# Patient Record
Sex: FEMALE | Race: WHITE | NOT HISPANIC OR LATINO | Employment: FULL TIME | URBAN - METROPOLITAN AREA
[De-identification: names, ages, dates, MRNs, and addresses within clinical notes are randomized per-mention and may not be internally consistent; named-entity substitution may affect disease eponyms.]

---

## 2017-01-29 ENCOUNTER — OFFICE VISIT (OUTPATIENT)
Dept: URGENT CARE | Facility: MEDICAL CENTER | Age: 47
End: 2017-01-29
Payer: COMMERCIAL

## 2017-01-29 PROCEDURE — G0382 LEV 3 HOSP TYPE B ED VISIT: HCPCS

## 2019-08-21 ENCOUNTER — OFFICE VISIT (OUTPATIENT)
Dept: URGENT CARE | Facility: MEDICAL CENTER | Age: 49
End: 2019-08-21
Payer: COMMERCIAL

## 2019-08-21 VITALS
TEMPERATURE: 98.3 F | BODY MASS INDEX: 27.46 KG/M2 | RESPIRATION RATE: 20 BRPM | HEIGHT: 63 IN | WEIGHT: 155 LBS | SYSTOLIC BLOOD PRESSURE: 118 MMHG | OXYGEN SATURATION: 97 % | DIASTOLIC BLOOD PRESSURE: 70 MMHG | HEART RATE: 85 BPM

## 2019-08-21 DIAGNOSIS — M65.4 DE QUERVAIN'S TENOSYNOVITIS, LEFT: Primary | ICD-10-CM

## 2019-08-21 PROCEDURE — 99213 OFFICE O/P EST LOW 20 MIN: CPT | Performed by: PHYSICIAN ASSISTANT

## 2019-08-21 NOTE — PROGRESS NOTES
[facility]        NAME: Ada Gonzalez is a 54 y o  female  : 1970    MRN: [id number]  DATE: 2019  TIME: 2:55 PM    Assessment and Plan   De Quervain's tenosynovitis, left [M65 4]  1  De Quervain's tenosynovitis, left       Physical exam findings consistent with tenosynovitis  Thumb spica splint applied to left hand  Advised patient to take Aleve or Motrin for inflammation and pain  Advised to ice the affected area  Recommended following up with PCP if symptoms do not improve in the next week or two  Patient Instructions    Please wear splint  During the day while at work, can remove at night   take Aleve or Motrin for inflammation and pain   may ice the affected area   follow up with PCP if symptoms do not resolve    Chief Complaint     Chief Complaint   Patient presents with    Wrist Pain     x 1 5 weeks left wrist pain , no injury   pain starts to left thumb and radiates into wrist           History of Present Illness        Patient is a 30-year-old female who comes in with left wrist pain at the base of the left thumb area  She states this is been present for about a week and a half  Patient is a  and does repetitive motion daily  She denies any known injuries or falls  She denies any numbness or tingling  Patient reports pain when moving her thumb uncertain movements  Review of Systems   Review of Systems   Constitutional: Negative for fever  Respiratory: Negative for shortness of breath  Cardiovascular: Negative for chest pain  Gastrointestinal: Negative for abdominal pain  Musculoskeletal: Positive for arthralgias  Negative for joint swelling  Skin: Negative for color change  Current Medications     No current outpatient medications on file      Current Allergies     Allergies as of 2019    (No Known Allergies)            The following portions of the patient's history were reviewed and updated as appropriate: allergies, current medications, past family history, past medical history, past social history, past surgical history and problem list      History reviewed  No pertinent past medical history  Past Surgical History:   Procedure Laterality Date    BACK SURGERY       SECTION         History reviewed  No pertinent family history  Medications have been verified  Objective   /70   Pulse 85   Temp 98 3 °F (36 8 °C) (Temporal)   Resp 20   Ht 5' 3" (1 6 m)   Wt 70 3 kg (155 lb)   SpO2 97%   BMI 27 46 kg/m²        Physical Exam     Physical Exam   Constitutional: She appears well-developed and well-nourished  Cardiovascular: Normal rate and regular rhythm  Pulmonary/Chest: Effort normal and breath sounds normal    Musculoskeletal:        Arms:       Left hand: Normal strength noted  Hands:  Skin: Skin is warm and dry

## 2019-08-21 NOTE — PATIENT INSTRUCTIONS
Please wear splint  During the day while at work, can remove at night   take Aleve or Motrin for inflammation and pain   may ice the affected area   follow up with PCP if symptoms do not resolve    Tenosynovitis   WHAT YOU NEED TO KNOW:   Tenosynovitis is inflammation of a tendon and its synovium  Tendons are cords of tissue that connect muscles to bones  The synovium is the lining of the sheath around the tendon  The tendons may become thickened and not slide smoothly through the swollen lining  The cause of tenosynovitis is not known  DISCHARGE INSTRUCTIONS:   Medicines:   · Pain medicines  may be given  Ask how to take this medicine safely  · NSAIDs , such as ibuprofen, help decrease swelling, pain, and fever  This medicine is available with or without a doctor's order  NSAIDs can cause stomach bleeding or kidney problems in certain people  If you take blood thinner medicine, always ask your healthcare provider if NSAIDs are safe for you  Always read the medicine label and follow directions  · Antibiotics  help treat a bacterial infection  · Antifungals  help treat a fungal infection  · Take your medicine as directed  Contact your healthcare provider if you think your medicine is not helping or if you have side effects  Tell him of her if you are allergic to any medicine  Keep a list of the medicines, vitamins, and herbs you take  Include the amounts, and when and why you take them  Bring the list or the pill bottles to follow-up visits  Carry your medicine list with you in case of an emergency  Follow up with your healthcare provider as directed:  Write down your questions so you remember to ask them during your visits  Self-care:   · Rest  your inflamed area as directed  · Apply ice  on your inflamed area for 15 to 20 minutes every hour or as directed  Use an ice pack, or put crushed ice in a plastic bag  Cover it with a towel   Ice helps prevent tissue damage and decreases swelling and pain     · Elevate  your inflamed area above the level of your heart as often as you can  This will help decrease swelling and pain  Prop your inflamed area on pillows or blankets to keep it elevated comfortably  · Use your assistive device as directed  You may need a brace, splint, walking boot, or cast  You may also need to use crutches or orthotics  These devices prevent movement, decrease pain, and help your tendon heal     · Go to physical or occupational therapy  A physical therapist teaches you exercises to help improve movement and strength and decrease pain  An occupational therapist teaches you skills to help with your daily activities  Contact your healthcare provider if:   · You have a fever  · A joint near your inflamed area is red and swollen  · Your symptoms do not go away or they get worse, even after treatment  · You have questions or concerns about your condition or care  Return to the emergency department if:   · You have sudden trouble breathing or chest pain  · Your arm, leg, fingers, or toes are numb, tingle, or are pale  · You hear or feel a pop  · You have severe pain  © 2017 [facility] Information is for End User's use only and may not be sold, redistributed or otherwise used for commercial purposes  All illustrations and images included in CareNotes® are the copyrighted property of A D A M , Inc  or Nikos Lomeli  The above information is an  only  It is not intended as medical advice for individual conditions or treatments  Talk to your doctor, nurse or pharmacist before following any medical regimen to see if it is safe and effective for you

## 2019-08-31 ENCOUNTER — HOSPITAL ENCOUNTER (EMERGENCY)
Facility: HOSPITAL | Age: 49
Discharge: HOME | End: 2019-08-31
Attending: EMERGENCY MEDICINE | Admitting: EMERGENCY MEDICINE
Payer: COMMERCIAL

## 2019-08-31 VITALS
BODY MASS INDEX: 27.34 KG/M2 | RESPIRATION RATE: 18 BRPM | OXYGEN SATURATION: 97 % | DIASTOLIC BLOOD PRESSURE: 90 MMHG | SYSTOLIC BLOOD PRESSURE: 119 MMHG | WEIGHT: 154.32 LBS | HEART RATE: 64 BPM

## 2019-08-31 DIAGNOSIS — M65.4 DE QUERVAIN'S TENOSYNOVITIS, LEFT: Primary | ICD-10-CM

## 2019-08-31 PROCEDURE — 99283 EMERGENCY DEPT VISIT LOW MDM: CPT

## 2019-08-31 PROCEDURE — 29130 APPL FINGER SPLINT STATIC: CPT | Performed by: EMERGENCY MEDICINE

## 2019-08-31 PROCEDURE — 99283 EMERGENCY DEPT VISIT LOW MDM: CPT | Performed by: EMERGENCY MEDICINE

## 2019-08-31 NOTE — ED NOTES
Patient assessed, treated (includes splinting) and discharged by provider       Facundo Granado RN  08/31/19 7039

## 2019-08-31 NOTE — ED PROVIDER NOTES
History  Chief Complaint   Patient presents with    Wrist Pain     pt presents with left wrist pain x 3 weeks  denies known injury  was seen at a walk in 1 week ago and diagnosed with an inflamed tendon  was told to come back if not g etting better  pt states pain persists but has improved slightly since last week  55 y/o female presents today complaining of left wrist pain which started approximately 3 weeks ago  Was seen and evaluated at urgent care for same  She had a splint placed and was recommended to follow up with her PCP if symptoms did not improve  She did not see her PCP she does came here today  No injury  History provided by:  Patient  Wrist Pain   Location:  Base of the left thumb  Severity:  Mild  Onset quality:  Gradual  Duration:  3 weeks  Timing:  Constant  Progression:  Waxing and waning  Chronicity:  New  Context:  See HPI  Associated symptoms: no fever, no headaches and no rash        None       History reviewed  No pertinent past medical history  Past Surgical History:   Procedure Laterality Date    BACK SURGERY       SECTION         History reviewed  No pertinent family history  I have reviewed and agree with the history as documented  Social History     Tobacco Use    Smoking status: Never Smoker    Smokeless tobacco: Never Used   Substance Use Topics    Alcohol use: Not Currently    Drug use: Never        Review of Systems   Constitutional: Negative for chills and fever  Musculoskeletal: Positive for arthralgias and joint swelling  Skin: Negative for pallor, rash and wound  Neurological: Negative for dizziness and headaches  Psychiatric/Behavioral: Negative for confusion  Physical Exam  Physical Exam   Constitutional: She is oriented to person, place, and time  She appears well-developed and well-nourished  HENT:   Head: Normocephalic and atraumatic  Musculoskeletal:        Left wrist: She exhibits tenderness   She exhibits no swelling and no effusion  Finkelstein's test positive  Tenderness of the APL and EPB  Neurological: She is alert and oriented to person, place, and time  Skin: Skin is warm and dry  Capillary refill takes less than 2 seconds  No rash noted  Psychiatric: She has a normal mood and affect  Nursing note and vitals reviewed  Vital Signs  ED Triage Vitals [08/31/19 1530]   Temp Pulse Respirations Blood Pressure SpO2   -- 64 18 119/90 97 %      Temp src Heart Rate Source Patient Position - Orthostatic VS BP Location FiO2 (%)   -- Monitor Sitting Right arm --      Pain Score       6           Vitals:    08/31/19 1530   BP: 119/90   Pulse: 64   Patient Position - Orthostatic VS: Sitting         Visual Acuity      ED Medications  Medications - No data to display    Diagnostic Studies  Results Reviewed     None                 No orders to display              Procedures  Splint application  Date/Time: 8/31/2019 3:54 PM  Performed by: Tere Primrose, DO  Authorized by: Tere Primrose, DO     Patient location:  ED  Procedure performed by emergency physician: Yes    Other Assisting Provider: No    Consent:     Consent obtained:  Verbal    Consent given by:  Patient    Risks discussed:  Discoloration, numbness, pain and swelling    Alternatives discussed:  No treatment, delayed treatment, alternative treatment, observation and referral  Universal protocol:     Procedure explained and questions answered to patient or proxy's satisfaction: yes      Patient identity confirmed:  Verbally with patient  Indication:     Indications: sprain/strain    Pre-procedure details:     Sensation:  Normal    Skin color:  Pink  Procedure details:     Laterality:  Left    Location:  Wrist    Wrist:  L wrist    Splint type:  Thumb spica    Supplies:  Ortho-Glass and elastic bandage  Post-procedure details:     Pain:  Improved    Sensation:  Normal    Neurovascular Exam: skin pink      Patient tolerance of procedure:   Tolerated well, no immediate complications           ED Course                               MDM  Number of Diagnoses or Management Options  De Quervain's tenosynovitis, left: new and does not require workup  Diagnosis management comments: Splint placed by me  Recommend follow-up with Ortho as an outpatient  Risk of Complications, Morbidity, and/or Mortality  Presenting problems: low  Diagnostic procedures: low  Management options: low    Patient Progress  Patient progress: stable      Disposition  Final diagnoses:   De Quervain's tenosynovitis, left     Time reflects when diagnosis was documented in both MDM as applicable and the Disposition within this note     Time User Action Codes Description Comment    8/31/2019  3:42 PM Alina Jennings Add [M65 4] De Quervain's tenosynovitis, left       ED Disposition     ED Disposition Condition Date/Time Comment    Discharge Stable Sat Aug 31, 2019  3:42 PM Donovan Montano discharge to home/self care  Follow-up Information     Follow up With Specialties Details Why Contact Info Additional [address] [facility][note 3] Orthopedic Surgery Schedule an appointment as soon as possible for a visit   [facility] [id number]  [place] [address], [address] [place] [address], [id number]          There are no discharge medications for this patient  No discharge procedures on file      ED Provider  Electronically Signed by           Valentina Muller DO  08/31/19 7562

## 2019-09-09 ENCOUNTER — OFFICE VISIT (OUTPATIENT)
Dept: OBGYN CLINIC | Facility: MEDICAL CENTER | Age: 49
End: 2019-09-09
Payer: COMMERCIAL

## 2019-09-09 VITALS
DIASTOLIC BLOOD PRESSURE: 77 MMHG | HEIGHT: 63 IN | BODY MASS INDEX: 28.6 KG/M2 | WEIGHT: 161.4 LBS | SYSTOLIC BLOOD PRESSURE: 119 MMHG | HEART RATE: 69 BPM

## 2019-09-09 DIAGNOSIS — M65.4 DE QUERVAIN'S TENOSYNOVITIS, LEFT: Primary | ICD-10-CM

## 2019-09-09 PROCEDURE — 20550 NJX 1 TENDON SHEATH/LIGAMENT: CPT | Performed by: ORTHOPAEDIC SURGERY

## 2019-09-09 PROCEDURE — 99203 OFFICE O/P NEW LOW 30 MIN: CPT | Performed by: ORTHOPAEDIC SURGERY

## 2019-09-09 RX ADMIN — METHYLPREDNISOLONE ACETATE 0.5 ML: 40 INJECTION, SUSPENSION INTRA-ARTICULAR; INTRALESIONAL; INTRAMUSCULAR; SOFT TISSUE at 11:09

## 2019-09-09 RX ADMIN — LIDOCAINE HYDROCHLORIDE 0.5 ML: 10 INJECTION, SOLUTION INFILTRATION; PERINEURAL at 11:09

## 2019-09-09 NOTE — PROGRESS NOTES
CHIEF COMPLAINT:  Chief Complaint   Patient presents with    Left Wrist - Pain       SUBJECTIVE:  Camilo Ceballos is a 54y o  year old female in for evaluation of her left wrist  She was seen at urgent care 19 and diagnosed with dequervain's tenosynitis and given wrist splint  Seen in ED 19 due to continued pain which was slightly better  She was placed in orthoglass thumb spica and her for f/u  No known injury or trauma  Pain ongoing for the past 5 weeks  PAST MEDICAL HISTORY:  History reviewed  No pertinent past medical history  PAST SURGICAL HISTORY:  Past Surgical History:   Procedure Laterality Date    BACK SURGERY       SECTION         FAMILY HISTORY:  History reviewed  No pertinent family history  SOCIAL HISTORY:  Social History     Tobacco Use    Smoking status: Never Smoker    Smokeless tobacco: Never Used   Substance Use Topics    Alcohol use: Not Currently    Drug use: Never       MEDICATIONS:  No current outpatient medications on file      ALLERGIES:  No Known Allergies    REVIEW OF SYSTEMS:  Review of Systems    VITALS:  Vitals:    19 1042   BP: 119/77   Pulse: 69       LABS:  HgA1c: No results found for: HGBA1C  BMP: No results found for: GLUCOSE, CALCIUM, NA, K, CO2, CL, BUN, CREATININE    _____________________________________________________  PHYSICAL EXAMINATION:  General: well developed and well nourished, alert, oriented times 3 and appears comfortable  Psychiatric: normal   HEENT: Trachea Midline, No torticollis  Pulmonary: No audible wheezing or respiratory distress   Skin: intact  Neurovascular: Sensation Intact to the Median, Ulnar, Radial Nerve, Motor Intact to the Median, Ulnar, Radial Nerve and Pulses Intact    MUSCULOSKELETAL EXAMINATION:  Left wrist    +Finkelstein's left wrist  Pain over 1st dorsal extensor compartment of left thumb  Pain with resisted ABD of thumb   Sensation intact throughout thumb ___________________________________________________  STUDIES REVIEWED:  No studies reviewed  PROCEDURES PERFORMED:  Hand/upper extremity injection: L extensor compartment 1  Date/Time: 9/9/2019 11:09 AM  Consent given by: patient  Site marked: site marked  Timeout: Immediately prior to procedure a time out was called to verify the correct patient, procedure, equipment, support staff and site/side marked as required   Supporting Documentation  Indications: pain   Procedure Details  Condition:de Quervain's tenosynovitis Site: L extensor compartment 1   Preparation: Patient was prepped and draped in the usual sterile fashion  Needle size: 25 G  Ultrasound guidance: no  Approach: dorsal  Medications administered: 0 5 mL lidocaine 1 %; 0 5 mL methylPREDNISolone acetate 40 mg/mL    Patient tolerance: patient tolerated the procedure well with no immediate complications  Dressing:  Sterile dressing applied             _____________________________________________________  ASSESSMENT/PLAN:  Left de quervain's tenosynitis      -csi offered and accepted  -use heat for any residual symptoms  -d/c thumb spica splint  -activities to tolerance    -see in 2 weeks to see if CSI was beneficial     There are no diagnoses linked to this encounter  Follow Up:  Return in about 2 weeks (around 9/23/2019) for Recheck  General Discussions:  Renetta Ferrari Tenosynovitis: The anatomy and physiology of de Quervain's tenosynovitis was discussed with the patient today in the office  Edema and increased contact pressure within the first dorsal extensor compartment at the radial styloid can cause pain, crepitation, and limitation of function  Treatment options include resting thumb spica splints to decrease edema, oral anti-inflammatory medications, home or formal therapy exercises, up to 2 steroid injections within the first dorsal extensor compartment, or surgical release    While majority of patients do respond to conservative treatment, up to 20% may require surgical release         Scribe Attestation    I,:   Elida Carreno am acting as a scribe while in the presence of the attending physician :        I,:   Aniket Gomez MD personally performed the services described in this documentation    as scribed in my presence :

## 2019-09-10 RX ORDER — LIDOCAINE HYDROCHLORIDE 10 MG/ML
0.5 INJECTION, SOLUTION INFILTRATION; PERINEURAL
Status: COMPLETED | OUTPATIENT
Start: 2019-09-09 | End: 2019-09-09

## 2019-09-10 RX ORDER — METHYLPREDNISOLONE ACETATE 40 MG/ML
0.5 INJECTION, SUSPENSION INTRA-ARTICULAR; INTRALESIONAL; INTRAMUSCULAR; SOFT TISSUE
Status: COMPLETED | OUTPATIENT
Start: 2019-09-09 | End: 2019-09-09

## 2019-09-23 ENCOUNTER — OFFICE VISIT (OUTPATIENT)
Dept: OBGYN CLINIC | Facility: MEDICAL CENTER | Age: 49
End: 2019-09-23
Payer: COMMERCIAL

## 2019-09-23 VITALS
HEIGHT: 63 IN | SYSTOLIC BLOOD PRESSURE: 117 MMHG | DIASTOLIC BLOOD PRESSURE: 74 MMHG | WEIGHT: 160.8 LBS | BODY MASS INDEX: 28.49 KG/M2 | HEART RATE: 55 BPM

## 2019-09-23 DIAGNOSIS — M65.4 DE QUERVAIN'S TENOSYNOVITIS, LEFT: Primary | ICD-10-CM

## 2019-09-23 PROCEDURE — 99213 OFFICE O/P EST LOW 20 MIN: CPT | Performed by: ORTHOPAEDIC SURGERY

## 2019-09-23 NOTE — PROGRESS NOTES
CHIEF COMPLAINT:  Chief Complaint   Patient presents with    Left Wrist - Follow-up       SUBJECTIVE:  Monique Vallejo is a 54y o  year old  female who presents to the office S/P left De Quirvain tenosynovitis CSI administered 19  Pt states that she has not been having pain since the CSI  Pt states that she does have some popping sensation in her left wrist in the morning  PAST MEDICAL HISTORY:  History reviewed  No pertinent past medical history  PAST SURGICAL HISTORY:  Past Surgical History:   Procedure Laterality Date    BACK SURGERY       SECTION         FAMILY HISTORY:  History reviewed  No pertinent family history  SOCIAL HISTORY:  Social History     Tobacco Use    Smoking status: Never Smoker    Smokeless tobacco: Never Used   Substance Use Topics    Alcohol use: Not Currently    Drug use: Never       MEDICATIONS:  No current outpatient medications on file  ALLERGIES:  No Known Allergies    REVIEW OF SYSTEMS:  Review of Systems   Constitutional: Negative for chills, fever and unexpected weight change  HENT: Negative for hearing loss, nosebleeds and sore throat  Eyes: Negative for pain, redness and visual disturbance  Respiratory: Negative for cough, shortness of breath and wheezing  Cardiovascular: Negative for chest pain, palpitations and leg swelling  Gastrointestinal: Negative for abdominal pain, nausea and vomiting  Endocrine: Negative for polydipsia and polyuria  Genitourinary: Negative for dysuria and hematuria  Skin: Negative for rash and wound  Neurological: Negative for dizziness and headaches  Psychiatric/Behavioral: Negative for decreased concentration, dysphoric mood and suicidal ideas  The patient is not nervous/anxious          VITALS:  Vitals:    19 0901   BP: 117/74   Pulse: 55       LABS:  HgA1c: No results found for: HGBA1C  BMP: No results found for: GLUCOSE, CALCIUM, NA, K, CO2, CL, BUN, CREATININE    _____________________________________________________  PHYSICAL EXAMINATION:  General: well developed and well nourished, alert, oriented times 3 and appears comfortable  Psychiatric: Normal  HEENT: Trachea Midline, No torticollis  Pulmonary: No audible wheezing or respiratory distress   Skin: No masses, erythema, lacerations, fluctation, ulcerations  Neurovascular: Sensation Intact to the Median, Ulnar, Radial Nerve, Motor Intact to the Median, Ulnar, Radial Nerve and Pulses Intact    MUSCULOSKELETAL EXAMINATION:  De Quervain's Tenosynovitis Exam:  left side    Negative tender to palpation over 1st dorsal extensor compartment   Negative palpable nodule  Negative crepitus over 1st dorsal extensor compartment   Negative Finkelstein's test    Negative pain with resisted abduction of the thumb      ___________________________________________________  STUDIES REVIEWED:  No studies reviewed  PROCEDURES PERFORMED:  Procedures  No Procedures performed today    _____________________________________________________  ASSESSMENT/PLAN:    Left De Quervain tenosynovitis   * patient was advised to continue applying heat and stretching as shown in the office  * patient was advised to call the office if she has return of pain that she is not able to manage at home      Follow Up:  Return if symptoms worsen or fail to improve          To Do Next Visit:  Re-evaluation of current issue        Scribe Attestation    I,:   Rhys Bello am acting as a scribe while in the presence of the attending physician :        I,:   Anita Wu MD personally performed the services described in this documentation    as scribed in my presence :

## 2020-05-05 ENCOUNTER — OFFICE VISIT (OUTPATIENT)
Dept: OBGYN CLINIC | Facility: CLINIC | Age: 50
End: 2020-05-05
Payer: COMMERCIAL

## 2020-05-05 VITALS
SYSTOLIC BLOOD PRESSURE: 107 MMHG | HEART RATE: 49 BPM | BODY MASS INDEX: 28.49 KG/M2 | HEIGHT: 63 IN | WEIGHT: 160.8 LBS | DIASTOLIC BLOOD PRESSURE: 73 MMHG

## 2020-05-05 DIAGNOSIS — M65.4 DE QUERVAIN'S TENOSYNOVITIS, LEFT: Primary | ICD-10-CM

## 2020-05-05 PROCEDURE — 20550 NJX 1 TENDON SHEATH/LIGAMENT: CPT | Performed by: ORTHOPAEDIC SURGERY

## 2020-05-05 PROCEDURE — 99213 OFFICE O/P EST LOW 20 MIN: CPT | Performed by: ORTHOPAEDIC SURGERY

## 2020-05-05 RX ORDER — TRIAMCINOLONE ACETONIDE 40 MG/ML
20 INJECTION, SUSPENSION INTRA-ARTICULAR; INTRAMUSCULAR
Status: COMPLETED | OUTPATIENT
Start: 2020-05-05 | End: 2020-05-05

## 2020-05-05 RX ORDER — LIDOCAINE HYDROCHLORIDE 10 MG/ML
0.5 INJECTION, SOLUTION INFILTRATION; PERINEURAL
Status: COMPLETED | OUTPATIENT
Start: 2020-05-05 | End: 2020-05-05

## 2020-05-05 RX ADMIN — TRIAMCINOLONE ACETONIDE 20 MG: 40 INJECTION, SUSPENSION INTRA-ARTICULAR; INTRAMUSCULAR at 08:10

## 2020-05-05 RX ADMIN — LIDOCAINE HYDROCHLORIDE 0.5 ML: 10 INJECTION, SOLUTION INFILTRATION; PERINEURAL at 08:10

## 2020-09-28 ENCOUNTER — OFFICE VISIT (OUTPATIENT)
Dept: OBGYN CLINIC | Facility: MEDICAL CENTER | Age: 50
End: 2020-09-28
Payer: COMMERCIAL

## 2020-09-28 VITALS
SYSTOLIC BLOOD PRESSURE: 113 MMHG | HEIGHT: 63 IN | BODY MASS INDEX: 28.49 KG/M2 | DIASTOLIC BLOOD PRESSURE: 71 MMHG | HEART RATE: 65 BPM | WEIGHT: 160.8 LBS | TEMPERATURE: 96.5 F

## 2020-09-28 DIAGNOSIS — M65.4 DE QUERVAIN'S TENOSYNOVITIS, LEFT: Primary | ICD-10-CM

## 2020-09-28 PROCEDURE — 20550 NJX 1 TENDON SHEATH/LIGAMENT: CPT | Performed by: ORTHOPAEDIC SURGERY

## 2020-09-28 PROCEDURE — 99213 OFFICE O/P EST LOW 20 MIN: CPT | Performed by: ORTHOPAEDIC SURGERY

## 2020-09-28 RX ORDER — LIDOCAINE HYDROCHLORIDE 10 MG/ML
0.5 INJECTION, SOLUTION INFILTRATION; PERINEURAL
Status: COMPLETED | OUTPATIENT
Start: 2020-09-28 | End: 2020-09-28

## 2020-09-28 RX ORDER — TRIAMCINOLONE ACETONIDE 40 MG/ML
20 INJECTION, SUSPENSION INTRA-ARTICULAR; INTRAMUSCULAR
Status: COMPLETED | OUTPATIENT
Start: 2020-09-28 | End: 2020-09-28

## 2020-09-28 RX ADMIN — LIDOCAINE HYDROCHLORIDE 0.5 ML: 10 INJECTION, SOLUTION INFILTRATION; PERINEURAL at 14:19

## 2020-09-28 RX ADMIN — TRIAMCINOLONE ACETONIDE 20 MG: 40 INJECTION, SUSPENSION INTRA-ARTICULAR; INTRAMUSCULAR at 14:19

## 2020-09-28 NOTE — PROGRESS NOTES
CHIEF COMPLAINT:  Chief Complaint   Patient presents with    Left Wrist - Follow-up       SUBJECTIVE:  Jil Santillan is a 52y o  year old female who presents for follow-up regarding left de Quervain tenosynovitis  Her last cortisone injection was on 2020  She has had 2 cortisone injections at this point     She would like a repeat cortisone injection for this issue as the pain started to come back about 1 month ago  She denies any numbness or tingling  PAST MEDICAL HISTORY:  History reviewed  No pertinent past medical history  PAST SURGICAL HISTORY:  Past Surgical History:   Procedure Laterality Date    BACK SURGERY       SECTION         FAMILY HISTORY:  History reviewed  No pertinent family history  SOCIAL HISTORY:  Social History     Tobacco Use    Smoking status: Never Smoker    Smokeless tobacco: Never Used   Substance Use Topics    Alcohol use: Not Currently    Drug use: Never       MEDICATIONS:  No current outpatient medications on file      ALLERGIES:  No Known Allergies    REVIEW OF SYSTEMS:  Review of Systems  ROS:   General: no fever, no chills  HEENT:  No loss of hearing or eyesight problems  Eyes:  No red eyes  Respiratory:  No coughing, shortness of breath or wheezing  Cardiovascular:  No chest pain, no palpitations  GI:  Abdomen soft nontender, denies nausea  Endocrine:  No muscle weakness, no frequent urination, no excessive thirst  Urinary:  No dysuria, no incontinence  Musculoskeletal: see HPI and PE  SKIN:  No skin rash, no dry skin  Neurological:  No headaches, no confusion  Psychiatric:  No suicide thoughts, no anxiety, no depression  Review of all other systems is negative    VITALS:  Vitals:    20 1402   BP: 113/71   Pulse: 65   Temp: (!) 96 5 °F (35 8 °C)       LABS:  HgA1c: No results found for: HGBA1C  BMP: No results found for: GLUCOSE, CALCIUM, NA, K, CO2, CL, BUN, CREATININE    _____________________________________________________  PHYSICAL EXAMINATION:  General: well developed and well nourished, alert, oriented times 3 and appears comfortable  Psychiatric: Normal  HEENT: Trachea Midline, No torticollis  Pulmonary: No audible wheezing or respiratory distress   Skin: No masses, erythema, lacerations, fluctation, ulcerations  Neurovascular: Sensation Intact to the Median, Ulnar, Radial Nerve, Motor Intact to the Median, Ulnar, Radial Nerve and Pulses Intact    MUSCULOSKELETAL EXAMINATION:  De Quervain's Tenosynovitis Exam:  left side    Positive tender to palpation over 1st dorsal extensor compartment   Positive palpable nodule  Positive crepitus over 1st dorsal extensor compartment   Positive Finkelstein's test    Positive pain with resisted abduction of the thumb      ___________________________________________________  STUDIES REVIEWED:  No studies reviewed  PROCEDURES PERFORMED:  Hand/upper extremity injection: L extensor compartment 1  Date/Time: 9/28/2020 2:19 PM  Consent given by: patient  Site marked: site marked  Timeout: Immediately prior to procedure a time out was called to verify the correct patient, procedure, equipment, support staff and site/side marked as required   Supporting Documentation  Indications: pain   Procedure Details  Condition:de Quervain's tenosynovitis Site: L extensor compartment 1   Needle size: 25 G  Medications administered: 0 5 mL lidocaine 1 %; 20 mg triamcinolone acetonide 40 mg/mL    Patient tolerance: patient tolerated the procedure well with no immediate complications  Dressing:  Sterile dressing applied              _____________________________________________________  ASSESSMENT/PLAN:      Diagnoses and all orders for this visit:    De Quervain's tenosynovitis, left  - patient was given cortisone injection today  She tolerated well    - she was advised to use heat and stretching  - she can take Tylenol and ibuprofen as needed for pain  - she was advised that we would have to proceed with surgical intervention if this were to return as she has had 3 cortisone injections for this issue  Follow Up:  Return if symptoms worsen or fail to improve      Work/school status:  No restrictions    To Do Next Visit:  Re-evaluation of current issue      Scribe Attestation    I,:   Barby Alves PA-C am acting as a scribe while in the presence of the attending physician :        I,:   aMbel Foreman MD personally performed the services described in this documentation    as scribed in my presence :

## 2020-10-26 ENCOUNTER — OFFICE VISIT (OUTPATIENT)
Dept: FAMILY MEDICINE CLINIC | Facility: CLINIC | Age: 50
End: 2020-10-26
Payer: COMMERCIAL

## 2020-10-26 VITALS
HEIGHT: 63 IN | BODY MASS INDEX: 29.23 KG/M2 | OXYGEN SATURATION: 96 % | SYSTOLIC BLOOD PRESSURE: 104 MMHG | WEIGHT: 165 LBS | DIASTOLIC BLOOD PRESSURE: 68 MMHG | RESPIRATION RATE: 18 BRPM | HEART RATE: 74 BPM | TEMPERATURE: 98 F

## 2020-10-26 DIAGNOSIS — Z13.1 SCREENING FOR DIABETES MELLITUS: ICD-10-CM

## 2020-10-26 DIAGNOSIS — D17.1 LIPOMA OF BACK: ICD-10-CM

## 2020-10-26 DIAGNOSIS — Z76.89 ENCOUNTER TO ESTABLISH CARE WITH NEW DOCTOR: ICD-10-CM

## 2020-10-26 DIAGNOSIS — Z23 ENCOUNTER FOR IMMUNIZATION: ICD-10-CM

## 2020-10-26 DIAGNOSIS — Z13.220 SCREENING FOR HYPERLIPIDEMIA: ICD-10-CM

## 2020-10-26 DIAGNOSIS — Z12.31 ENCOUNTER FOR SCREENING MAMMOGRAM FOR BREAST CANCER: Primary | ICD-10-CM

## 2020-10-26 PROCEDURE — 99386 PREV VISIT NEW AGE 40-64: CPT | Performed by: FAMILY MEDICINE

## 2020-10-26 PROCEDURE — 90471 IMMUNIZATION ADMIN: CPT

## 2020-10-26 PROCEDURE — 90472 IMMUNIZATION ADMIN EACH ADD: CPT

## 2020-10-26 PROCEDURE — 90715 TDAP VACCINE 7 YRS/> IM: CPT

## 2020-10-26 PROCEDURE — 3725F SCREEN DEPRESSION PERFORMED: CPT | Performed by: FAMILY MEDICINE

## 2020-10-26 PROCEDURE — 90686 IIV4 VACC NO PRSV 0.5 ML IM: CPT

## 2020-10-28 ENCOUNTER — APPOINTMENT (OUTPATIENT)
Dept: LAB | Facility: MEDICAL CENTER | Age: 50
End: 2020-10-28
Payer: COMMERCIAL

## 2020-10-28 DIAGNOSIS — Z13.220 SCREENING FOR HYPERLIPIDEMIA: ICD-10-CM

## 2020-10-28 DIAGNOSIS — Z13.1 SCREENING FOR DIABETES MELLITUS: ICD-10-CM

## 2020-10-28 LAB
ANION GAP SERPL CALCULATED.3IONS-SCNC: 2 MMOL/L (ref 4–13)
BUN SERPL-MCNC: 13 MG/DL (ref 5–25)
CALCIUM SERPL-MCNC: 8.7 MG/DL (ref 8.3–10.1)
CHLORIDE SERPL-SCNC: 111 MMOL/L (ref 100–108)
CHOLEST SERPL-MCNC: 197 MG/DL (ref 50–200)
CO2 SERPL-SCNC: 29 MMOL/L (ref 21–32)
CREAT SERPL-MCNC: 0.72 MG/DL (ref 0.6–1.3)
GFR SERPL CREATININE-BSD FRML MDRD: 99 ML/MIN/1.73SQ M
GLUCOSE P FAST SERPL-MCNC: 82 MG/DL (ref 65–99)
HDLC SERPL-MCNC: 64 MG/DL
LDLC SERPL CALC-MCNC: 126 MG/DL (ref 0–100)
POTASSIUM SERPL-SCNC: 4.4 MMOL/L (ref 3.5–5.3)
SODIUM SERPL-SCNC: 142 MMOL/L (ref 136–145)
TRIGL SERPL-MCNC: 34 MG/DL

## 2020-10-28 PROCEDURE — 80061 LIPID PANEL: CPT

## 2020-10-28 PROCEDURE — 80048 BASIC METABOLIC PNL TOTAL CA: CPT

## 2020-10-28 PROCEDURE — 36415 COLL VENOUS BLD VENIPUNCTURE: CPT

## 2020-11-04 ENCOUNTER — CONSULT (OUTPATIENT)
Dept: SURGERY | Facility: CLINIC | Age: 50
End: 2020-11-04
Payer: COMMERCIAL

## 2020-11-04 VITALS
SYSTOLIC BLOOD PRESSURE: 120 MMHG | BODY MASS INDEX: 29.23 KG/M2 | TEMPERATURE: 98.3 F | WEIGHT: 165 LBS | HEART RATE: 58 BPM | HEIGHT: 63 IN | DIASTOLIC BLOOD PRESSURE: 84 MMHG

## 2020-11-04 DIAGNOSIS — D17.1 LIPOMA OF BACK: ICD-10-CM

## 2020-11-04 PROCEDURE — 99243 OFF/OP CNSLTJ NEW/EST LOW 30: CPT | Performed by: SURGERY

## 2020-11-04 PROCEDURE — 1036F TOBACCO NON-USER: CPT | Performed by: SURGERY

## 2020-11-09 ENCOUNTER — ANNUAL EXAM (OUTPATIENT)
Dept: FAMILY MEDICINE CLINIC | Facility: CLINIC | Age: 50
End: 2020-11-09
Payer: COMMERCIAL

## 2020-11-09 VITALS
SYSTOLIC BLOOD PRESSURE: 102 MMHG | WEIGHT: 165 LBS | TEMPERATURE: 98.2 F | HEIGHT: 63 IN | DIASTOLIC BLOOD PRESSURE: 64 MMHG | HEART RATE: 76 BPM | BODY MASS INDEX: 29.23 KG/M2 | RESPIRATION RATE: 18 BRPM | OXYGEN SATURATION: 98 %

## 2020-11-09 DIAGNOSIS — Z12.4 SCREENING FOR CERVICAL CANCER: Primary | ICD-10-CM

## 2020-11-09 PROCEDURE — 3008F BODY MASS INDEX DOCD: CPT | Performed by: FAMILY MEDICINE

## 2020-11-09 PROCEDURE — G0145 SCR C/V CYTO,THINLAYER,RESCR: HCPCS | Performed by: FAMILY MEDICINE

## 2020-11-09 PROCEDURE — 99396 PREV VISIT EST AGE 40-64: CPT | Performed by: FAMILY MEDICINE

## 2020-11-09 PROCEDURE — 1036F TOBACCO NON-USER: CPT | Performed by: FAMILY MEDICINE

## 2020-11-09 PROCEDURE — 87624 HPV HI-RISK TYP POOLED RSLT: CPT | Performed by: FAMILY MEDICINE

## 2020-11-11 ENCOUNTER — APPOINTMENT (OUTPATIENT)
Dept: LAB | Facility: MEDICAL CENTER | Age: 50
End: 2020-11-11
Payer: COMMERCIAL

## 2020-11-11 ENCOUNTER — CLINICAL SUPPORT (OUTPATIENT)
Dept: URGENT CARE | Facility: MEDICAL CENTER | Age: 50
End: 2020-11-11
Payer: COMMERCIAL

## 2020-11-11 DIAGNOSIS — Z01.818 ENCOUNTER FOR PREADMISSION TESTING: Primary | ICD-10-CM

## 2020-11-11 DIAGNOSIS — D17.1 LIPOMA OF BACK: ICD-10-CM

## 2020-11-11 LAB
ERYTHROCYTE [DISTWIDTH] IN BLOOD BY AUTOMATED COUNT: 13.5 % (ref 11.6–15.1)
HCT VFR BLD AUTO: 41.2 % (ref 34.8–46.1)
HGB BLD-MCNC: 12.9 G/DL (ref 11.5–15.4)
HPV HR 12 DNA CVX QL NAA+PROBE: NEGATIVE
HPV16 DNA CVX QL NAA+PROBE: NEGATIVE
HPV18 DNA CVX QL NAA+PROBE: NEGATIVE
MCH RBC QN AUTO: 29.2 PG (ref 26.8–34.3)
MCHC RBC AUTO-ENTMCNC: 31.3 G/DL (ref 31.4–37.4)
MCV RBC AUTO: 93 FL (ref 82–98)
PLATELET # BLD AUTO: 268 THOUSANDS/UL (ref 149–390)
PMV BLD AUTO: 10.4 FL (ref 8.9–12.7)
RBC # BLD AUTO: 4.42 MILLION/UL (ref 3.81–5.12)
WBC # BLD AUTO: 6.43 THOUSAND/UL (ref 4.31–10.16)

## 2020-11-11 PROCEDURE — 93005 ELECTROCARDIOGRAM TRACING: CPT

## 2020-11-11 PROCEDURE — 93010 ELECTROCARDIOGRAM REPORT: CPT | Performed by: INTERNAL MEDICINE

## 2020-11-11 PROCEDURE — 36415 COLL VENOUS BLD VENIPUNCTURE: CPT

## 2020-11-11 PROCEDURE — 85027 COMPLETE CBC AUTOMATED: CPT

## 2020-11-12 LAB
ATRIAL RATE: 49 BPM
P AXIS: 55 DEGREES
PR INTERVAL: 166 MS
QRS AXIS: 11 DEGREES
QRSD INTERVAL: 92 MS
QT INTERVAL: 450 MS
QTC INTERVAL: 406 MS
T WAVE AXIS: 22 DEGREES
VENTRICULAR RATE: 49 BPM

## 2020-11-12 PROCEDURE — 93010 ELECTROCARDIOGRAM REPORT: CPT | Performed by: INTERNAL MEDICINE

## 2020-11-13 LAB
LAB AP GYN PRIMARY INTERPRETATION: NORMAL
Lab: NORMAL

## 2020-11-30 ENCOUNTER — ANESTHESIA (OUTPATIENT)
Dept: PERIOP | Facility: HOSPITAL | Age: 50
End: 2020-11-30
Payer: COMMERCIAL

## 2020-11-30 ENCOUNTER — ANESTHESIA EVENT (OUTPATIENT)
Dept: PERIOP | Facility: HOSPITAL | Age: 50
End: 2020-11-30
Payer: COMMERCIAL

## 2020-11-30 ENCOUNTER — HOSPITAL ENCOUNTER (OUTPATIENT)
Facility: HOSPITAL | Age: 50
Setting detail: OUTPATIENT SURGERY
Discharge: HOME | End: 2020-11-30
Attending: SURGERY | Admitting: SURGERY
Payer: COMMERCIAL

## 2020-11-30 VITALS
WEIGHT: 165 LBS | RESPIRATION RATE: 18 BRPM | SYSTOLIC BLOOD PRESSURE: 133 MMHG | BODY MASS INDEX: 29.23 KG/M2 | HEART RATE: 84 BPM | TEMPERATURE: 98.4 F | DIASTOLIC BLOOD PRESSURE: 75 MMHG | HEIGHT: 63 IN | OXYGEN SATURATION: 98 %

## 2020-11-30 VITALS — HEART RATE: 89 BPM

## 2020-11-30 DIAGNOSIS — D17.1 LIPOMA OF BACK: ICD-10-CM

## 2020-11-30 PROBLEM — M54.9 BACK PAIN: Status: ACTIVE | Noted: 2020-11-30

## 2020-11-30 PROCEDURE — 21931 EXC BACK LES SC 3 CM/>: CPT | Performed by: SURGERY

## 2020-11-30 PROCEDURE — 88304 TISSUE EXAM BY PATHOLOGIST: CPT | Performed by: PATHOLOGY

## 2020-11-30 RX ORDER — FENTANYL CITRATE 50 UG/ML
INJECTION, SOLUTION INTRAMUSCULAR; INTRAVENOUS AS NEEDED
Status: DISCONTINUED | OUTPATIENT
Start: 2020-11-30 | End: 2020-11-30

## 2020-11-30 RX ORDER — BUPIVACAINE HYDROCHLORIDE AND EPINEPHRINE 5; 5 MG/ML; UG/ML
INJECTION, SOLUTION EPIDURAL; INTRACAUDAL; PERINEURAL AS NEEDED
Status: DISCONTINUED | OUTPATIENT
Start: 2020-11-30 | End: 2020-11-30 | Stop reason: HOSPADM

## 2020-11-30 RX ORDER — SODIUM CHLORIDE, SODIUM LACTATE, POTASSIUM CHLORIDE, CALCIUM CHLORIDE 600; 310; 30; 20 MG/100ML; MG/100ML; MG/100ML; MG/100ML
100 INJECTION, SOLUTION INTRAVENOUS CONTINUOUS
Status: DISCONTINUED | OUTPATIENT
Start: 2020-11-30 | End: 2020-11-30 | Stop reason: HOSPADM

## 2020-11-30 RX ORDER — LIDOCAINE HYDROCHLORIDE 10 MG/ML
INJECTION, SOLUTION EPIDURAL; INFILTRATION; INTRACAUDAL; PERINEURAL AS NEEDED
Status: DISCONTINUED | OUTPATIENT
Start: 2020-11-30 | End: 2020-11-30

## 2020-11-30 RX ORDER — ONDANSETRON 2 MG/ML
4 INJECTION INTRAMUSCULAR; INTRAVENOUS ONCE AS NEEDED
Status: DISCONTINUED | OUTPATIENT
Start: 2020-11-30 | End: 2020-11-30 | Stop reason: HOSPADM

## 2020-11-30 RX ORDER — OXYCODONE HYDROCHLORIDE AND ACETAMINOPHEN 5; 325 MG/1; MG/1
1 TABLET ORAL EVERY 4 HOURS PRN
Status: DISCONTINUED | OUTPATIENT
Start: 2020-11-30 | End: 2020-11-30

## 2020-11-30 RX ORDER — MIDAZOLAM HYDROCHLORIDE 2 MG/2ML
INJECTION, SOLUTION INTRAMUSCULAR; INTRAVENOUS AS NEEDED
Status: DISCONTINUED | OUTPATIENT
Start: 2020-11-30 | End: 2020-11-30

## 2020-11-30 RX ORDER — PROPOFOL 10 MG/ML
INJECTION, EMULSION INTRAVENOUS AS NEEDED
Status: DISCONTINUED | OUTPATIENT
Start: 2020-11-30 | End: 2020-11-30

## 2020-11-30 RX ORDER — SUCCINYLCHOLINE/SOD CL,ISO/PF 100 MG/5ML
SYRINGE (ML) INTRAVENOUS AS NEEDED
Status: DISCONTINUED | OUTPATIENT
Start: 2020-11-30 | End: 2020-11-30

## 2020-11-30 RX ORDER — FENTANYL CITRATE/PF 50 MCG/ML
25 SYRINGE (ML) INJECTION
Status: DISCONTINUED | OUTPATIENT
Start: 2020-11-30 | End: 2020-11-30 | Stop reason: HOSPADM

## 2020-11-30 RX ORDER — CEFAZOLIN SODIUM 2 G/50ML
2000 SOLUTION INTRAVENOUS ONCE
Status: COMPLETED | OUTPATIENT
Start: 2020-11-30 | End: 2020-11-30

## 2020-11-30 RX ORDER — ONDANSETRON 2 MG/ML
INJECTION INTRAMUSCULAR; INTRAVENOUS AS NEEDED
Status: DISCONTINUED | OUTPATIENT
Start: 2020-11-30 | End: 2020-11-30

## 2020-11-30 RX ORDER — OXYCODONE HYDROCHLORIDE AND ACETAMINOPHEN 5; 325 MG/1; MG/1
2 TABLET ORAL EVERY 4 HOURS PRN
Status: DISCONTINUED | OUTPATIENT
Start: 2020-11-30 | End: 2020-11-30 | Stop reason: HOSPADM

## 2020-11-30 RX ORDER — SODIUM CHLORIDE, SODIUM LACTATE, POTASSIUM CHLORIDE, CALCIUM CHLORIDE 600; 310; 30; 20 MG/100ML; MG/100ML; MG/100ML; MG/100ML
INJECTION, SOLUTION INTRAVENOUS CONTINUOUS PRN
Status: DISCONTINUED | OUTPATIENT
Start: 2020-11-30 | End: 2020-11-30

## 2020-11-30 RX ORDER — DEXAMETHASONE SODIUM PHOSPHATE 10 MG/ML
INJECTION, SOLUTION INTRAMUSCULAR; INTRAVENOUS AS NEEDED
Status: DISCONTINUED | OUTPATIENT
Start: 2020-11-30 | End: 2020-11-30

## 2020-11-30 RX ORDER — HYDROMORPHONE HCL/PF 1 MG/ML
0.2 SYRINGE (ML) INJECTION
Status: DISCONTINUED | OUTPATIENT
Start: 2020-11-30 | End: 2020-11-30 | Stop reason: HOSPADM

## 2020-11-30 RX ORDER — OXYCODONE HYDROCHLORIDE AND ACETAMINOPHEN 5; 325 MG/1; MG/1
1 TABLET ORAL EVERY 4 HOURS PRN
Qty: 12 TABLET | Refills: 0 | Status: SHIPPED | OUTPATIENT
Start: 2020-11-30 | End: 2020-12-10

## 2020-11-30 RX ADMIN — FENTANYL CITRATE 50 MCG: 50 INJECTION INTRAMUSCULAR; INTRAVENOUS at 10:49

## 2020-11-30 RX ADMIN — FENTANYL CITRATE 50 MCG: 50 INJECTION INTRAMUSCULAR; INTRAVENOUS at 11:13

## 2020-11-30 RX ADMIN — PROPOFOL 170 MG: 10 INJECTION, EMULSION INTRAVENOUS at 10:49

## 2020-11-30 RX ADMIN — CEFAZOLIN SODIUM 1000 MG: 2 SOLUTION INTRAVENOUS at 10:46

## 2020-11-30 RX ADMIN — Medication 100 MG: at 10:49

## 2020-11-30 RX ADMIN — OXYCODONE HYDROCHLORIDE AND ACETAMINOPHEN 2 TABLET: 5; 325 TABLET ORAL at 12:58

## 2020-11-30 RX ADMIN — SODIUM CHLORIDE, SODIUM LACTATE, POTASSIUM CHLORIDE, AND CALCIUM CHLORIDE: .6; .31; .03; .02 INJECTION, SOLUTION INTRAVENOUS at 10:40

## 2020-11-30 RX ADMIN — ONDANSETRON 4 MG: 2 INJECTION INTRAMUSCULAR; INTRAVENOUS at 11:02

## 2020-11-30 RX ADMIN — DEXAMETHASONE SODIUM PHOSPHATE 4 MG: 10 INJECTION, SOLUTION INTRAMUSCULAR; INTRAVENOUS at 11:02

## 2020-11-30 RX ADMIN — FENTANYL CITRATE 25 MCG: 50 INJECTION INTRAMUSCULAR; INTRAVENOUS at 12:17

## 2020-11-30 RX ADMIN — LIDOCAINE HYDROCHLORIDE 50 MG: 10 INJECTION, SOLUTION EPIDURAL; INFILTRATION; INTRACAUDAL at 10:49

## 2020-11-30 RX ADMIN — MIDAZOLAM HYDROCHLORIDE 2 MG: 1 INJECTION, SOLUTION INTRAMUSCULAR; INTRAVENOUS at 10:46

## 2020-12-01 ENCOUNTER — OFFICE VISIT (OUTPATIENT)
Dept: SURGERY | Facility: CLINIC | Age: 50
End: 2020-12-01

## 2020-12-01 VITALS
HEIGHT: 63 IN | WEIGHT: 165 LBS | SYSTOLIC BLOOD PRESSURE: 120 MMHG | BODY MASS INDEX: 29.23 KG/M2 | TEMPERATURE: 97.8 F | DIASTOLIC BLOOD PRESSURE: 80 MMHG | HEART RATE: 73 BPM

## 2020-12-01 DIAGNOSIS — D17.1 LIPOMA OF BACK: Primary | ICD-10-CM

## 2020-12-01 PROCEDURE — 99024 POSTOP FOLLOW-UP VISIT: CPT | Performed by: SURGERY

## 2020-12-07 ENCOUNTER — OFFICE VISIT (OUTPATIENT)
Dept: URGENT CARE | Facility: MEDICAL CENTER | Age: 50
End: 2020-12-07
Payer: COMMERCIAL

## 2020-12-07 ENCOUNTER — TELEPHONE (OUTPATIENT)
Dept: SURGERY | Facility: CLINIC | Age: 50
End: 2020-12-07

## 2020-12-07 VITALS
WEIGHT: 162 LBS | HEART RATE: 73 BPM | RESPIRATION RATE: 18 BRPM | OXYGEN SATURATION: 99 % | TEMPERATURE: 97.5 F | SYSTOLIC BLOOD PRESSURE: 121 MMHG | DIASTOLIC BLOOD PRESSURE: 70 MMHG | HEIGHT: 63 IN | BODY MASS INDEX: 28.7 KG/M2

## 2020-12-07 DIAGNOSIS — R21 RASH: Primary | ICD-10-CM

## 2020-12-07 PROCEDURE — G0382 LEV 3 HOSP TYPE B ED VISIT: HCPCS | Performed by: PHYSICIAN ASSISTANT

## 2020-12-07 PROCEDURE — 99213 OFFICE O/P EST LOW 20 MIN: CPT | Performed by: PHYSICIAN ASSISTANT

## 2020-12-07 PROCEDURE — 99283 EMERGENCY DEPT VISIT LOW MDM: CPT | Performed by: PHYSICIAN ASSISTANT

## 2020-12-07 RX ORDER — PREDNISONE 20 MG/1
40 TABLET ORAL DAILY
Qty: 10 TABLET | Refills: 0 | Status: SHIPPED | OUTPATIENT
Start: 2020-12-07 | End: 2020-12-12

## 2021-03-12 ENCOUNTER — HOSPITAL ENCOUNTER (OUTPATIENT)
Dept: RADIOLOGY | Facility: MEDICAL CENTER | Age: 51
Discharge: HOME | End: 2021-03-12
Payer: COMMERCIAL

## 2021-03-12 VITALS — HEIGHT: 62 IN | WEIGHT: 162 LBS | BODY MASS INDEX: 29.81 KG/M2

## 2021-03-12 DIAGNOSIS — Z12.31 ENCOUNTER FOR SCREENING MAMMOGRAM FOR BREAST CANCER: ICD-10-CM

## 2021-03-12 PROCEDURE — 77067 SCR MAMMO BI INCL CAD: CPT

## 2021-03-12 PROCEDURE — 77063 BREAST TOMOSYNTHESIS BI: CPT

## 2021-11-05 ENCOUNTER — TELEPHONE (OUTPATIENT)
Dept: FAMILY MEDICINE CLINIC | Facility: CLINIC | Age: 51
End: 2021-11-05

## 2021-11-08 ENCOUNTER — TELEPHONE (OUTPATIENT)
Dept: BEHAVIORAL/MENTAL HEALTH CLINIC | Facility: CLINIC | Age: 51
End: 2021-11-08

## 2021-11-09 ENCOUNTER — TELEMEDICINE (OUTPATIENT)
Dept: FAMILY MEDICINE CLINIC | Facility: CLINIC | Age: 51
End: 2021-11-09
Payer: COMMERCIAL

## 2021-11-09 DIAGNOSIS — J06.9 UPPER RESPIRATORY TRACT INFECTION DUE TO 2019 NOVEL CORONAVIRUS: Primary | ICD-10-CM

## 2021-11-09 DIAGNOSIS — U07.1 UPPER RESPIRATORY TRACT INFECTION DUE TO 2019 NOVEL CORONAVIRUS: Primary | ICD-10-CM

## 2021-11-09 PROCEDURE — 99213 OFFICE O/P EST LOW 20 MIN: CPT | Performed by: FAMILY MEDICINE

## 2021-11-11 ENCOUNTER — TELEPHONE (OUTPATIENT)
Dept: FAMILY MEDICINE CLINIC | Facility: CLINIC | Age: 51
End: 2021-11-11

## 2021-11-11 DIAGNOSIS — J06.9 UPPER RESPIRATORY TRACT INFECTION DUE TO 2019 NOVEL CORONAVIRUS: Primary | ICD-10-CM

## 2021-11-11 DIAGNOSIS — U07.1 UPPER RESPIRATORY TRACT INFECTION DUE TO 2019 NOVEL CORONAVIRUS: Primary | ICD-10-CM

## 2021-11-11 RX ORDER — BUDESONIDE 180 UG/1
4 AEROSOL, POWDER RESPIRATORY (INHALATION) 2 TIMES DAILY
Qty: 1 EACH | Refills: 0 | Status: SHIPPED | OUTPATIENT
Start: 2021-11-11 | End: 2021-12-20

## 2021-12-16 ENCOUNTER — TELEPHONE (OUTPATIENT)
Dept: FAMILY MEDICINE CLINIC | Facility: CLINIC | Age: 51
End: 2021-12-16

## 2021-12-16 ENCOUNTER — TRANSITIONAL CARE MANAGEMENT (OUTPATIENT)
Dept: FAMILY MEDICINE CLINIC | Facility: CLINIC | Age: 51
End: 2021-12-16

## 2021-12-16 RX ORDER — ATORVASTATIN CALCIUM 80 MG/1
80 TABLET, FILM COATED ORAL DAILY
COMMUNITY
Start: 2021-12-15 | End: 2022-01-10 | Stop reason: SDUPTHER

## 2021-12-16 RX ORDER — POLYETHYLENE GLYCOL 3350 17 G/17G
17 POWDER, FOR SOLUTION ORAL 2 TIMES DAILY PRN
COMMUNITY
Start: 2021-12-15 | End: 2021-12-18

## 2021-12-16 RX ORDER — SENNA PLUS 8.6 MG/1
8.6 TABLET ORAL 2 TIMES DAILY PRN
COMMUNITY
Start: 2021-12-15 | End: 2022-03-28 | Stop reason: ALTCHOICE

## 2021-12-20 ENCOUNTER — OFFICE VISIT (OUTPATIENT)
Dept: FAMILY MEDICINE CLINIC | Facility: CLINIC | Age: 51
End: 2021-12-20
Payer: COMMERCIAL

## 2021-12-20 VITALS
SYSTOLIC BLOOD PRESSURE: 114 MMHG | WEIGHT: 154 LBS | BODY MASS INDEX: 27.29 KG/M2 | TEMPERATURE: 99.2 F | OXYGEN SATURATION: 96 % | RESPIRATION RATE: 17 BRPM | HEART RATE: 80 BPM | DIASTOLIC BLOOD PRESSURE: 72 MMHG | HEIGHT: 63 IN

## 2021-12-20 DIAGNOSIS — I63.111 CEREBROVASCULAR ACCIDENT (CVA) DUE TO EMBOLISM OF RIGHT VERTEBRAL ARTERY (HCC): ICD-10-CM

## 2021-12-20 DIAGNOSIS — U07.1 DEEP VEIN THROMBOSIS (DVT) ASSOCIATED WITH COVID-19: Primary | ICD-10-CM

## 2021-12-20 DIAGNOSIS — U07.1 COVID-19: ICD-10-CM

## 2021-12-20 DIAGNOSIS — H69.81 EUSTACHIAN TUBE DYSFUNCTION, RIGHT: ICD-10-CM

## 2021-12-20 DIAGNOSIS — H61.22 IMPACTED CERUMEN OF LEFT EAR: ICD-10-CM

## 2021-12-20 DIAGNOSIS — R13.10 DYSPHAGIA, UNSPECIFIED TYPE: ICD-10-CM

## 2021-12-20 DIAGNOSIS — F43.21 ADJUSTMENT DISORDER WITH DEPRESSED MOOD: ICD-10-CM

## 2021-12-20 DIAGNOSIS — I82.90 DEEP VEIN THROMBOSIS (DVT) ASSOCIATED WITH COVID-19: Primary | ICD-10-CM

## 2021-12-20 DIAGNOSIS — H35.62 RETINAL HEMORRHAGE OF LEFT EYE: ICD-10-CM

## 2021-12-20 DIAGNOSIS — F41.9 ANXIETY: ICD-10-CM

## 2021-12-20 PROBLEM — H69.91 EUSTACHIAN TUBE DYSFUNCTION, RIGHT: Status: ACTIVE | Noted: 2021-12-20

## 2021-12-20 PROBLEM — D17.1 LIPOMA OF BACK: Status: RESOLVED | Noted: 2020-10-26 | Resolved: 2021-12-20

## 2021-12-20 PROCEDURE — 69210 REMOVE IMPACTED EAR WAX UNI: CPT | Performed by: FAMILY MEDICINE

## 2021-12-20 PROCEDURE — 99496 TRANSJ CARE MGMT HIGH F2F 7D: CPT | Performed by: FAMILY MEDICINE

## 2021-12-20 RX ORDER — FLUTICASONE PROPIONATE 50 MCG
1 SPRAY, SUSPENSION (ML) NASAL DAILY
Qty: 11.1 ML | Refills: 2 | Status: SHIPPED | OUTPATIENT
Start: 2021-12-20 | End: 2022-02-09 | Stop reason: SDUPTHER

## 2021-12-20 RX ORDER — ESCITALOPRAM OXALATE 10 MG/1
10 TABLET ORAL DAILY
Qty: 90 TABLET | Refills: 1 | Status: SHIPPED | OUTPATIENT
Start: 2021-12-20 | End: 2022-06-21 | Stop reason: SDUPTHER

## 2021-12-20 RX ORDER — POLYETHYLENE GLYCOL 3350 17 G/17G
17 POWDER, FOR SOLUTION ORAL DAILY
COMMUNITY
End: 2022-03-28 | Stop reason: ALTCHOICE

## 2021-12-21 ENCOUNTER — TELEPHONE (OUTPATIENT)
Dept: FAMILY MEDICINE CLINIC | Facility: CLINIC | Age: 51
End: 2021-12-21

## 2021-12-22 ENCOUNTER — TELEPHONE (OUTPATIENT)
Dept: FAMILY MEDICINE CLINIC | Facility: CLINIC | Age: 51
End: 2021-12-22

## 2022-01-05 ENCOUNTER — TELEPHONE (OUTPATIENT)
Dept: FAMILY MEDICINE CLINIC | Facility: CLINIC | Age: 52
End: 2022-01-05

## 2022-01-05 NOTE — TELEPHONE ENCOUNTER
Vanessa Friends from [facility] home health called to let Dr Bryant Bragg know she was discharging the patient today from home health

## 2022-01-10 DIAGNOSIS — U07.1 DEEP VEIN THROMBOSIS (DVT) ASSOCIATED WITH COVID-19: ICD-10-CM

## 2022-01-10 DIAGNOSIS — Z13.220 SCREENING FOR HYPERLIPIDEMIA: ICD-10-CM

## 2022-01-10 DIAGNOSIS — I82.90 DEEP VEIN THROMBOSIS (DVT) ASSOCIATED WITH COVID-19: ICD-10-CM

## 2022-01-10 DIAGNOSIS — R13.10 DYSPHAGIA, UNSPECIFIED TYPE: Primary | ICD-10-CM

## 2022-01-10 RX ORDER — ATORVASTATIN CALCIUM 80 MG/1
80 TABLET, FILM COATED ORAL DAILY
Qty: 90 TABLET | Refills: 2 | Status: SHIPPED | OUTPATIENT
Start: 2022-01-10 | End: 2022-02-09

## 2022-02-07 DIAGNOSIS — I82.90 DEEP VEIN THROMBOSIS (DVT) ASSOCIATED WITH COVID-19: ICD-10-CM

## 2022-02-07 DIAGNOSIS — U07.1 DEEP VEIN THROMBOSIS (DVT) ASSOCIATED WITH COVID-19: ICD-10-CM

## 2022-02-07 NOTE — TELEPHONE ENCOUNTER
Spoke to patient  She states that she called in her last refill and that is why she called in for new script

## 2022-02-08 NOTE — PROGRESS NOTES
FAMILY MEDICINE PROGRESS NOTE    Date of Service: 22  Primary Care Provider:   Elida Rivas MD       Name: Jovan Hays       : 1970       Age:52 y o  Sex: female      MRN: [id number]      Chief Complaint:Earache (right ear fluid and pressure ), Tinnitus (bilateral), and Sinusitis (facial swelling )       ASSESSMENT and PLAN:  Jovan Hays is a 50 y o  female with:     Problem List Items Addressed This Visit        Cardiovascular and Mediastinum    Cerebrovascular accident (CVA) due to embolism of right vertebral artery (HCC)       Nervous and Auditory    Eustachian tube dysfunction, right - Primary    Relevant Medications    fluticasone (FLONASE) 50 mcg/act nasal spray       Other    Retinal hemorrhage of left eye    Anxiety      Other Visit Diagnoses     Tinnitus, bilateral            Increase dose of Flonase to twice daily for eustachian tube dysfunction  No hearing loss with tinnitus, but would refer to audiology if this occurs  She is otherwise doing well, symptoms from CVA improving  SUBJECTIVE:  Jovan Hays is a 50 y o  female who presents today with a chief complaint of Earache (right ear fluid and pressure ), Tinnitus (bilateral), and Sinusitis (facial swelling )  HPI     Patient was last seen for TCM following hospitalization for COVID, acute respiratory failure with hypoxia, CVA, DVT  She had a complicated hospital stay, required PEG placement for dysphagia, developed retinal hemorrhage secondary to hypoxia  Since her last visit she had PEG tube pulled  She also saw neurology   She has upcoming MRI  From a mood standpoint she has done well on Lexparo 10 mg daily  Today she reports that she has had ringing in her ears, this started about 10 days ago  This is present most of the day  She denies muffled hearing, hearing loss  She does reports that it is better than when it first started  She feels sensation of fullness and water in her ears   She denies headaches  She reports that her left eye is still blurry  She sees the eye doctor later this month  Review of Systems   Constitutional: Negative for chills and fever  HENT: Positive for congestion, ear pain, facial swelling and tinnitus  Negative for ear discharge, hearing loss, postnasal drip, rhinorrhea, sinus pressure, sinus pain and sore throat  Eyes: Positive for visual disturbance  Neurological: Positive for numbness  I have reviewed the patient's Past Medical History  Current Outpatient Medications:     apixaban (ELIQUIS) 5 mg, Take 1 tablet (5 mg total) by mouth 2 (two) times a day, Disp: 180 tablet, Rfl: 2    atorvastatin (LIPITOR) 80 mg tablet, Take 1 tablet (80 mg total) by mouth daily, Disp: 90 tablet, Rfl: 2    escitalopram (LEXAPRO) 10 mg tablet, Take 1 tablet (10 mg total) by mouth daily, Disp: 90 tablet, Rfl: 1    fluticasone (FLONASE) 50 mcg/act nasal spray, 1 spray into each nostril 2 (two) times a day, Disp: 11 1 mL, Rfl: 2    polyethylene glycol (GLYCOLAX) 17 GM/SCOOP powder, Take 17 g by mouth daily, Disp: , Rfl:     senna (SENOKOT) 8 6 MG tablet, Take 8 6 mg by mouth 2 (two) times a day as needed, Disp: , Rfl:     OBJECTIVE:  /80 (BP Location: Left arm, Patient Position: Sitting, Cuff Size: Standard)   Pulse 81   Temp (!) 97 1 °F (36 2 °C)   Resp 18   Ht 5' 2 5" (1 588 m)   Wt 70 3 kg (155 lb)   SpO2 97%   Breastfeeding No   BMI 27 90 kg/m²    BP Readings from Last 3 Encounters:   02/09/22 120/80   12/20/21 114/72   12/15/20 114/74      Wt Readings from Last 3 Encounters:   02/09/22 70 3 kg (155 lb)   12/20/21 69 9 kg (154 lb)   03/12/21 73 5 kg (162 lb)      Physical Exam  Constitutional:       General: She is not in acute distress  Appearance: Normal appearance  She is normal weight  She is not ill-appearing or toxic-appearing  HENT:      Head: Normocephalic and atraumatic  Right Ear: External ear normal  No swelling or tenderness   A middle ear effusion is present  No mastoid tenderness  Left Ear: External ear normal  No swelling or tenderness  A middle ear effusion is present  Nose: Nose normal       Mouth/Throat:      Mouth: Mucous membranes are moist       Pharynx: No oropharyngeal exudate or posterior oropharyngeal erythema  Tonsils: No tonsillar exudate or tonsillar abscesses  Eyes:      Extraocular Movements: Extraocular movements intact  Conjunctiva/sclera: Conjunctivae normal    Cardiovascular:      Rate and Rhythm: Normal rate and regular rhythm  Pulmonary:      Effort: Pulmonary effort is normal  No respiratory distress  Breath sounds: Normal breath sounds  Musculoskeletal:         General: Normal range of motion  Cervical back: Normal range of motion and neck supple  Skin:     Findings: No erythema or rash  Neurological:      General: No focal deficit present  Mental Status: She is alert and oriented to person, place, and time  Psychiatric:         Mood and Affect: Mood normal          Behavior: Behavior normal                 Return for Next scheduled follow up  Alfred Mccray MD    Note: Portions of the record have been created with voice recognition software  Occasional wrong word or "sound a like" substitutions may have occurred due to the inherent limitations of voice recognition software  Read the chart carefully and recognize, using context, where substitutions have occurred

## 2022-02-09 ENCOUNTER — OFFICE VISIT (OUTPATIENT)
Dept: FAMILY MEDICINE CLINIC | Facility: CLINIC | Age: 52
End: 2022-02-09
Payer: COMMERCIAL

## 2022-02-09 VITALS
WEIGHT: 155 LBS | OXYGEN SATURATION: 97 % | SYSTOLIC BLOOD PRESSURE: 120 MMHG | BODY MASS INDEX: 27.46 KG/M2 | DIASTOLIC BLOOD PRESSURE: 80 MMHG | RESPIRATION RATE: 18 BRPM | HEART RATE: 81 BPM | TEMPERATURE: 97.1 F | HEIGHT: 63 IN

## 2022-02-09 DIAGNOSIS — H93.13 TINNITUS, BILATERAL: ICD-10-CM

## 2022-02-09 DIAGNOSIS — I63.111 CEREBROVASCULAR ACCIDENT (CVA) DUE TO EMBOLISM OF RIGHT VERTEBRAL ARTERY (HCC): ICD-10-CM

## 2022-02-09 DIAGNOSIS — H69.81 EUSTACHIAN TUBE DYSFUNCTION, RIGHT: Primary | ICD-10-CM

## 2022-02-09 DIAGNOSIS — H35.62 RETINAL HEMORRHAGE OF LEFT EYE: ICD-10-CM

## 2022-02-09 DIAGNOSIS — F41.9 ANXIETY: ICD-10-CM

## 2022-02-09 PROCEDURE — 99213 OFFICE O/P EST LOW 20 MIN: CPT | Performed by: FAMILY MEDICINE

## 2022-02-09 RX ORDER — FLUTICASONE PROPIONATE 50 MCG
1 SPRAY, SUSPENSION (ML) NASAL DAILY
Qty: 11.1 ML | Refills: 2 | Status: SHIPPED | OUTPATIENT
Start: 2022-02-09 | End: 2022-02-09

## 2022-02-09 RX ORDER — FLUTICASONE PROPIONATE 50 MCG
1 SPRAY, SUSPENSION (ML) NASAL 2 TIMES DAILY
Qty: 11.1 ML | Refills: 2 | Status: SHIPPED | OUTPATIENT
Start: 2022-02-09

## 2022-02-09 NOTE — PATIENT INSTRUCTIONS
Tinnitus   WHAT YOU NEED TO KNOW:   What is tinnitus? Tinnitus is when you hear ringing, clicking, buzzing, or hissing in one or both ears  You may also hear whistling, chirping, or pulsing  It may be soft or loud, and at a low or high pitch  Tinnitus that lasts longer than 6 months is considered chronic  What causes or increases my risk for tinnitus? Tinnitus may be caused by problems with your hearing system, including the parts of your brain that sort out sounds  Tinnitus may also be caused by a health condition, such as Ménière disease  The following may increase your risk:  · Age older than 60 years    · Exposure to loud noise    · Hearing loss or abnormal bone structure in the ear    · Ear and sinus infections, or wax buildup    · Hormone changes in women    · Diseases of the heart and blood vessels, or brain tumors    · Certain medicines, such as aspirin, NSAIDs, methotrexate, and erythromycin    · Anxiety, sleep problems, or depression    How is tinnitus diagnosed? Your healthcare provider will ask about your symptoms and examine your ears, jaw, and neck  Tell him if you have tinnitus all the time or if it comes and goes  He may ask if anything makes it worse, such as stress or anxiety  You may need any of the following tests:  · A hearing test  may show problems with your ear  Your eardrum and middle ear may also be examined and tested  · An ultrasound, CT, MRI, or MRA  may show the cause of your tinnitus  You may be given contrast liquid to help the parts of your ear show up better in the pictures  Tell the healthcare provider if you have ever had an allergic reaction to contrast liquid  Do not enter the MRI room with anything metal  Metal can cause serious injury  Tell the healthcare provider if you have any metal in or on your body  How is tinnitus treated? You may not need treatment  Your symptoms may only appear when you are anxious or stressed   Your healthcare provider may stop certain medicines that may be causing your tinnitus  You may also need medicines to help decrease your symptoms  The following can help treat or manage tinnitus:  · Counseling  can help you learn ways to relax, decrease stress, and make your tinnitus less noticeable  · Cognitive behavioral therapy  helps you understand your condition  Your therapist will help you learn to cope with tinnitus  You may also learn new ways to relax and retrain your behavior to decrease your symptoms  · Sound therapy,  such as white noise machines, may help cover your tinnitus with a pleasant sound  Sound therapy devices can help you fall asleep or help you relax  These devices can be worn in your ear or placed next to your bed at night  · Hearing aids or cochlear implants  may help if you have hearing loss  · Surgery  may be needed if your tinnitus is caused by abnormal blood vessels or a mass  · Do not smoke  Nicotine decreases blood flow to your ear and can make your tinnitus worse  Do not use e-cigarettes or smokeless tobacco in place of cigarettes or to help you quit  They still contain nicotine  Ask your healthcare provider for information if you currently smoke and need help quitting  · Decrease how much alcohol and caffeine you drink  Alcohol and caffeine can make your tinnitus worse  How can I help prevent tinnitus? · Avoid exposure to loud noise, such as loud music or power tools  Occasional exposure can still cause tinnitus  Move away from the noise or turn down the volume  · Wear ear protection  when you are exposed to loud noises  Good ear protection includes ear plugs or headphones that reduce noise  Call 911 if:   · You feel like hurting yourself or others because of the constant noise  When should I contact my healthcare provider? · You have headaches  · You are tired and have trouble concentrating or remembering things      · You have more anxiety or stress than usual     · You have deep sadness or depression  · You have trouble falling asleep or staying asleep  · Your symptoms do not go away or they get worse  · You have questions or concerns about your condition or care  CARE AGREEMENT:   You have the right to help plan your care  Learn about your health condition and how it may be treated  Discuss treatment options with your healthcare providers to decide what care you want to receive  You always have the right to refuse treatment  The above information is an  only  It is not intended as medical advice for individual conditions or treatments  Talk to your doctor, nurse or pharmacist before following any medical regimen to see if it is safe and effective for you  © Copyright [employer] 2021 Information is for End User's use only and may not be sold, redistributed or otherwise used for commercial purposes   All illustrations and images included in CareNotes® are the copyrighted property of [employer]  or [address] [place] [address] [employer]

## 2022-03-27 PROBLEM — R13.10 DYSPHAGIA: Status: RESOLVED | Noted: 2021-12-20 | Resolved: 2022-03-27

## 2022-03-27 NOTE — ASSESSMENT & PLAN NOTE
Noted on admission to the hospital for [place] in right common femoral and calf veins   She is on Eliquis for anticoagulation, on review of notes from neurology, anticoagulation recommended for 3 to 6 months for embolic stroke

## 2022-03-27 NOTE — ASSESSMENT & PLAN NOTE
Occurred acutely during hospitalization for COVID, MRI with Extensive infarcts in the left PCA territory, likely right PCA territory, bilateral cerebellar hemispheres, and right medulla  Thrombosis/occlusion of the right vertebral artery and left PCA  She will continue statin and Eliquis  Need for repeat imaging being considered by neurology     Left sided weakness is improving, she still has decreased sensation to temperature in left upper extremity, numbness in left thigh   She will see neurology in two weeks

## 2022-03-27 NOTE — PROGRESS NOTES
FAMILY MEDICINE PROGRESS NOTE    Date of Service: 22  Primary Care Provider:   Maria Eugenia Pederson MD       Name: Hernán Moore       : 1970       Age:52 y o  Sex: female      MRN: [id number]      Chief Complaint:Follow-up (3 months)       ASSESSMENT and PLAN:  Hernán Moore is a 50 y o  female with:     Problem List Items Addressed This Visit        Cardiovascular and Mediastinum    Deep vein thrombosis (DVT) associated with COVID-19 - Primary     Noted on admission to the hospital for [place] in right common femoral and calf veins  She is on Eliquis for anticoagulation, on review of notes from neurology, anticoagulation recommended for 3 to 6 months for embolic stroke         Cerebrovascular accident (CVA) due to embolism of right vertebral artery ([address] )     Occurred acutely during hospitalization for COVID, MRI with Extensive infarcts in the left PCA territory, likely right PCA territory, bilateral cerebellar hemispheres, and right medulla  Thrombosis/occlusion of the right vertebral artery and left PCA  She will continue statin and Eliquis  Need for repeat imaging being considered by neurology     Left sided weakness is improving, she still has decreased sensation to temperature in left upper extremity, numbness in left thigh   She will see neurology in two weeks         Relevant Orders    Lipid panel       Nervous and Auditory    Eustachian tube dysfunction, right     Continue Flonase            Other    Retinal hemorrhage of left eye     Her vision is improving, she will see optho again in          Adjustment disorder with depressed mood    Anxiety     Secondary to recent severe illness with COVID  She is on Lexapro 10 mg daily            Other Visit Diagnoses     Encounter for screening mammogram for breast cancer        Relevant Orders    Mammo screening bilateral w 3d & cad    Prediabetes        Relevant Orders    Basic metabolic panel    Hemoglobin A1C    Need for hepatitis C screening test        Relevant Orders    Hepatitis C Antibody ([facility])    Screening for HIV (human immunodeficiency virus)        Relevant Orders    HIV 1/2 Antigen/Antibody (4th Generation) w Reflex [facility]    Tinnitus of both ears              SUBJECTIVE:  Jovan Hays is a 50 y o  female who presents today with a chief complaint of Follow-up (3 months)  HPI     Patient presents for follow-up  She was seen for TCM in December following hospitalization for COVID  She had acute hypoxic respiratory failure, DVT/TX  She also had embolic stroke in right vertebral artery,and V4 segment during hospitalization  She had left sided weakness, but most pronounced symptoms were paresthesias and blurred vision in left eye due to macular hemorrhage secondary to hypoxia  She was started on Lexapro for anxiety in December  She had health related anxiety due to illness  She is following with neurology, MRI was ordered though denied by insurance  Per neurology, note in telephone encounter on 2/15/2022, anticoagulation recommended for 3 to 6 months since stroke was attributed to [place]  Today she states that she is making slow progress  She is able to  heavier things than she was able to do previously; she still does not have the hot/cold sensation in left upper extremity  She still has ringing in her ears that comes and goes  She was using Flonase, she states that this was not helpful     Her vision is improving, she saw optho last month and was told that her vision is 4 times better  She will see optho again in June  She feels like her mood is improving, she does feel like the Lexapro is helping  She is struggling sitting at home doing nothing, she likes to be active and she wants to go back to work, but neurology still has not cleared her to go to work  She will see neurology in two weeks  Review of Systems   Constitutional: Negative for appetite change, chills, fatigue and fever     HENT: Positive for tinnitus  Negative for congestion and hearing loss  Respiratory: Negative for shortness of breath  Cardiovascular: Negative for chest pain  Gastrointestinal: Negative for constipation and diarrhea  Musculoskeletal: Negative for gait problem  Neurological: Positive for weakness and numbness (decreased sensation in left upper extremity)  Negative for dizziness, light-headedness and headaches  Psychiatric/Behavioral: Negative for sleep disturbance  The patient is nervous/anxious  I have reviewed the patient's Past Medical History  Current Outpatient Medications:     apixaban (ELIQUIS) 5 mg, Take 1 tablet (5 mg total) by mouth 2 (two) times a day, Disp: 180 tablet, Rfl: 2    atorvastatin (LIPITOR) 80 mg tablet, Take 1 tablet (80 mg total) by mouth daily, Disp: 90 tablet, Rfl: 2    escitalopram (LEXAPRO) 10 mg tablet, Take 1 tablet (10 mg total) by mouth daily, Disp: 90 tablet, Rfl: 1    fluticasone (FLONASE) 50 mcg/act nasal spray, 1 spray into each nostril 2 (two) times a day, Disp: 11 1 mL, Rfl: 2    OBJECTIVE:  /70 (BP Location: Left arm, Patient Position: Sitting, Cuff Size: Standard)   Pulse 72   Temp (!) 72 °F (22 2 °C)   Ht 5' 2 5" (1 588 m)   Wt 71 9 kg (158 lb 8 oz)   SpO2 95%   BMI 28 53 kg/m²    BP Readings from Last 3 Encounters:   03/28/22 100/70   02/09/22 120/80   12/20/21 114/72      Wt Readings from Last 3 Encounters:   03/28/22 71 9 kg (158 lb 8 oz)   02/09/22 70 3 kg (155 lb)   12/20/21 69 9 kg (154 lb)      Physical Exam  Constitutional:       General: She is not in acute distress  Appearance: Normal appearance  She is normal weight  She is not ill-appearing or toxic-appearing  HENT:      Head: Normocephalic and atraumatic  Right Ear: External ear normal  No decreased hearing noted  A middle ear effusion is present  There is no impacted cerumen  Left Ear: External ear normal  No decreased hearing noted  No middle ear effusion   There is no impacted cerumen  Eyes:      Extraocular Movements: Extraocular movements intact  Conjunctiva/sclera: Conjunctivae normal    Neck:      Vascular: No carotid bruit  Cardiovascular:      Rate and Rhythm: Normal rate and regular rhythm  Pulses: Normal pulses  Heart sounds: No murmur heard  No friction rub  No gallop  Pulmonary:      Effort: Pulmonary effort is normal  No respiratory distress  Breath sounds: Normal breath sounds  No stridor  No wheezing, rhonchi or rales  Abdominal:      General: There is no distension  Palpations: Abdomen is soft  Musculoskeletal:         General: Normal range of motion  Cervical back: Normal range of motion and neck supple  No rigidity or tenderness  Right lower leg: No edema  Left lower leg: No edema  Lymphadenopathy:      Cervical: No cervical adenopathy  Skin:     General: Skin is warm and dry  Findings: No erythema or rash  Neurological:      General: No focal deficit present  Mental Status: She is alert and oriented to person, place, and time  Sensory: No sensory deficit  Motor: No weakness  Psychiatric:         Mood and Affect: Mood normal          Behavior: Behavior normal                 Return in about 6 months (around 9/28/2022) for physical      Aracely Brown MD    Note: Portions of the record have been created with voice recognition software  Occasional wrong word or "sound a like" substitutions may have occurred due to the inherent limitations of voice recognition software  Read the chart carefully and recognize, using context, where substitutions have occurred

## 2022-03-28 ENCOUNTER — OFFICE VISIT (OUTPATIENT)
Dept: FAMILY MEDICINE CLINIC | Facility: CLINIC | Age: 52
End: 2022-03-28
Payer: COMMERCIAL

## 2022-03-28 VITALS
HEART RATE: 72 BPM | SYSTOLIC BLOOD PRESSURE: 100 MMHG | BODY MASS INDEX: 28.08 KG/M2 | TEMPERATURE: 72 F | DIASTOLIC BLOOD PRESSURE: 70 MMHG | WEIGHT: 158.5 LBS | OXYGEN SATURATION: 95 % | HEIGHT: 63 IN

## 2022-03-28 DIAGNOSIS — H35.62 RETINAL HEMORRHAGE OF LEFT EYE: ICD-10-CM

## 2022-03-28 DIAGNOSIS — R73.03 PREDIABETES: ICD-10-CM

## 2022-03-28 DIAGNOSIS — H93.13 TINNITUS OF BOTH EARS: ICD-10-CM

## 2022-03-28 DIAGNOSIS — Z12.31 ENCOUNTER FOR SCREENING MAMMOGRAM FOR BREAST CANCER: ICD-10-CM

## 2022-03-28 DIAGNOSIS — H69.81 EUSTACHIAN TUBE DYSFUNCTION, RIGHT: ICD-10-CM

## 2022-03-28 DIAGNOSIS — Z11.59 NEED FOR HEPATITIS C SCREENING TEST: ICD-10-CM

## 2022-03-28 DIAGNOSIS — Z11.4 SCREENING FOR HIV (HUMAN IMMUNODEFICIENCY VIRUS): ICD-10-CM

## 2022-03-28 DIAGNOSIS — U07.1 DEEP VEIN THROMBOSIS (DVT) ASSOCIATED WITH COVID-19: Primary | ICD-10-CM

## 2022-03-28 DIAGNOSIS — F41.9 ANXIETY: ICD-10-CM

## 2022-03-28 DIAGNOSIS — I63.111 CEREBROVASCULAR ACCIDENT (CVA) DUE TO EMBOLISM OF RIGHT VERTEBRAL ARTERY (HCC): ICD-10-CM

## 2022-03-28 DIAGNOSIS — F43.21 ADJUSTMENT DISORDER WITH DEPRESSED MOOD: ICD-10-CM

## 2022-03-28 DIAGNOSIS — I82.90 DEEP VEIN THROMBOSIS (DVT) ASSOCIATED WITH COVID-19: Primary | ICD-10-CM

## 2022-03-28 PROCEDURE — 99214 OFFICE O/P EST MOD 30 MIN: CPT | Performed by: FAMILY MEDICINE

## 2022-06-21 DIAGNOSIS — F41.9 ANXIETY: ICD-10-CM

## 2022-06-21 DIAGNOSIS — F43.21 ADJUSTMENT DISORDER WITH DEPRESSED MOOD: ICD-10-CM

## 2022-06-21 RX ORDER — ESCITALOPRAM OXALATE 10 MG/1
10 TABLET ORAL DAILY
Qty: 90 TABLET | Refills: 1 | Status: SHIPPED | OUTPATIENT
Start: 2022-06-21 | End: 2022-12-18

## 2022-09-19 LAB
HBA1C MFR BLD HPLC: 6 %
HCV AB SER-ACNC: NEGATIVE

## 2022-09-29 NOTE — PROGRESS NOTES
[facility]      Date of Service: 22  Primary Care Provider:   Elida Rivas MD       Name: Jovan Hasy       : 1970       Age:52 y o  Sex: female      MRN: [id number]      Chief Complaint:Follow-up       Assessment and Plan:  50 y o  female exam      1  Health Maintenance  - Colon Cancer Screening:  Due to start screening  - Cervical Cancer Screening:  Last Pap smear 2020 was normal with negative HPV, repeat in   - Breast Cancer Screening:  Last mammogram in 2021, repeat mammogram pending  - Labs: done previously   - Immunizations: Reviewed  Recommend yearly flu vaccine  2  Other diagnoses addressed today:   Problem List Items Addressed This Visit        Digestive    Pharyngoesophageal dysphagia     She had dysphagia during hospitalization in November secondary to CVA and required PEG tube  This was removed previously though she now reports sensation of liquids getting stuck when swallowing and occasional coughing/aspiration  Will obtain barium swallow to further evaluate         Relevant Orders    FL barium swallow ROUTINE esophagus       Cardiovascular and Mediastinum    Cerebrovascular accident (CVA) due to embolism of right vertebral artery ([facility]) - Primary     Occurred acutely during hospitalization for COVID in 2021, MRI with Extensive infarcts in the left PCA territory, likely right PCA territory, bilateral cerebellar hemispheres, and right medulla  Thrombosis/occlusion of the right vertebral artery and left PCA  Thrombosis resolved on recent MRA in April  She is no longer on Eliquis and now on aspirin and statin         Relevant Medications    aspirin (ECOTRIN LOW STRENGTH) 81 mg EC tablet    atorvastatin (LIPITOR) 80 mg tablet    RESOLVED: Deep vein thrombosis (DVT) associated with COVID-19     Noted on admission to the hospital for COVID 2021 in right common femoral and calf veins   She completed Eliquis in April Nervous and Auditory    Eustachian tube dysfunction, right    Relevant Medications    fluticasone (FLONASE) 50 mcg/act nasal spray       Other    Retinal hemorrhage of left eye     Vision has been improving, she will see optho again in December          Adjustment disorder with depressed mood    Relevant Medications    escitalopram (LEXAPRO) 10 mg tablet    Anxiety     Her mood is overall stable, will continue Lexapro 10 mg         Relevant Medications    escitalopram (LEXAPRO) 10 mg tablet    Prediabetes     Lab Results   Component Value Date    HGBA1C 6 0 (H) 09/19/2022    HGBA1C 6 0 09/19/2022    HGBA1C 6 3 (H) 11/16/2021     Lab Results   Component Value Date    GLUF 82 10/28/2020    [address] 126 (H) 10/28/2020    CREATININE 0 72 10/28/2020     A1C in prediabetic range  Counseled patient on the importance of lifestyle interventions, specifically discussed a whole food, plant based diet low in saturated fat and processed foods  Discussed the importance of a diet that is rich in whole grains, fruits and vegetables, beans and legumes  Tinnitus of both ears     She continues to have tinnitus in bilateral ears, recommended audiology evaluation         Relevant Orders    Ambulatory Referral to Audiology      Other Visit Diagnoses     Annual physical exam        Screening for colorectal cancer        Relevant Orders    Ambulatory referral for Colonoscopy    Encounter for immunization        Relevant Orders    influenza vaccine, quadrivalent, recombinant, PF, 0 5 mL, for patients 18 yr+ (FLUBLOK) (Completed)           Immunizations and preventive care screenings were discussed with patient today  Appropriate education was printed on patient's after visit summary  Counseling:  Alcohol/drug use: discussed moderation in alcohol intake, the recommendations for healthy alcohol use, and avoidance of illicit drug use      Dental Health: discussed importance of regular tooth brushing, flossing, and dental visits  Injury prevention: discussed safety/seat belts, safety helmets, smoke detectors, carbon dioxide detectors, and smoking near bedding or upholstery  Exercise: the importance of regular exercise/physical activity was discussed  Recommend exercise 3-5 times per week for at least 30 minutes  BMI Counseling: Body mass index is 31 86 kg/m²  The BMI is above normal  Nutrition recommendations include decreasing portion sizes, encouraging healthy choices of fruits and vegetables, consuming healthier snacks and reducing intake of saturated and trans fat  Exercise recommendations include moderate physical activity 150 minutes/week  Rationale for BMI follow-up plan is due to patient being overweight or obese  Follow up next physical in 1 year  Subjective: Gabriele Landon is a 50 y o  female and is here for a comprehensive physical exam      Acute Complaints:       Diet and Physical Activity  Diet/Nutrition: does follow a well balanced diet  Exercise: no formal exercise  General Health  Vision: no vision problems and goes for regular eye exams  Dental: regular dental visits  Gyn Health:       No LMP recorded   Patient is postmenopausal   Up to date on PAP    Histories Updated and Reviewed 9/30/2022:  Patient's Medications   New Prescriptions    No medications on file   Previous Medications    ASPIRIN (ECOTRIN LOW STRENGTH) 81 MG EC TABLET    Take 81 mg by mouth daily   Modified Medications    Modified Medication Previous Medication    ATORVASTATIN (LIPITOR) 80 MG TABLET atorvastatin (LIPITOR) 80 mg tablet       Take 1 tablet (80 mg total) by mouth daily    Take 1 tablet (80 mg total) by mouth daily    ESCITALOPRAM (LEXAPRO) 10 MG TABLET escitalopram (LEXAPRO) 10 mg tablet       Take 1 tablet (10 mg total) by mouth daily    Take 1 tablet (10 mg total) by mouth daily    FLUTICASONE (FLONASE) 50 MCG/ACT NASAL SPRAY fluticasone (FLONASE) 50 mcg/act nasal spray       1 spray into each nostril 2 (two) times a day    1 spray into each nostril 2 (two) times a day   Discontinued Medications    APIXABAN (ELIQUIS) 5 MG    Take 1 tablet (5 mg total) by mouth 2 (two) times a day     No Known Allergies  Past Medical History:   Diagnosis Date    Anxiety     COVID-19 2021    DDD (degenerative disc disease), lumbar     Deep vein thrombosis (DVT) associated with COVID-2021    Lipoma of back 10/26/2020    Stroke [facility])      Social History     Socioeconomic History    Marital status: Single     Spouse name: Not on file    Number of children: Not on file    Years of education: Not on file    Highest education level: Not on file   Occupational History    Not on file   Tobacco Use    Smoking status: Former Smoker     Packs/day: 0 25     Years: 10 00     Pack years: 2 50     Types: Cigarettes     Quit date: 1996     Years since quittin 7    Smokeless tobacco: Never Used   Vaping Use    Vaping Use: Never used   Substance and Sexual Activity    Alcohol use:  Yes    Drug use: Never    Sexual activity: Not Currently     Partners: Male     Birth control/protection: None   Other Topics Concern    Not on file   Social History Narrative    Not on file     Social Determinants of Health     Financial Resource Strain: Not on file   Food Insecurity: Not on file   Transportation Needs: Not on file   Physical Activity: Not on file   Stress: Not on file   Social Connections: Not on file   Intimate Partner Violence: Not on file   Housing Stability: Not on file     Immunization History   Administered Date(s) Administered    Influenza, injectable, quadrivalent, preservative free 0 5 mL 10/26/2020    Influenza, recombinant, quadrivalent,injectable, preservative free 2022    Tdap 10/26/2020       Objective:  /84   Pulse 78   Temp (!) 96 5 °F (35 8 °C)   Resp 17   Ht 5' 2 5" (1 588 m)   Wt 80 3 kg (177 lb)   SpO2 97%   BMI 31 86 kg/m²   BP Readings from Last 3 Encounters: 09/30/22 122/84   03/28/22 100/70   02/09/22 120/80      Wt Readings from Last 3 Encounters:   09/30/22 80 3 kg (177 lb)   03/28/22 71 9 kg (158 lb 8 oz)   02/09/22 70 3 kg (155 lb)      Physical Exam  Constitutional:       General: She is not in acute distress  Appearance: Normal appearance  She is obese  She is not ill-appearing or toxic-appearing  HENT:      Head: Normocephalic and atraumatic  Right Ear: Tympanic membrane, ear canal and external ear normal       Left Ear: Tympanic membrane, ear canal and external ear normal       Nose: Nose normal       Mouth/Throat:      Mouth: Mucous membranes are moist    Eyes:      Extraocular Movements: Extraocular movements intact  Conjunctiva/sclera: Conjunctivae normal    Cardiovascular:      Rate and Rhythm: Normal rate  Pulses: Normal pulses  Heart sounds: Normal heart sounds  No murmur heard  No friction rub  Pulmonary:      Effort: Pulmonary effort is normal  No respiratory distress  Breath sounds: Normal breath sounds  No stridor  No wheezing, rhonchi or rales  Abdominal:      General: There is no distension  Palpations: Abdomen is soft  Musculoskeletal:         General: Normal range of motion  Cervical back: Normal range of motion and neck supple  No rigidity  Right lower leg: No edema  Left lower leg: No edema  Lymphadenopathy:      Cervical: No cervical adenopathy  Skin:     General: Skin is warm and dry  Findings: No erythema or rash  Neurological:      General: No focal deficit present  Mental Status: She is alert and oriented to person, place, and time  Psychiatric:         Mood and Affect: Mood normal          Behavior: Behavior normal          Patient Care Team:  Tiki Sousa MD as PCP - General (Family Medicine)  Win Ba MD as PCP - [facility]CP-[facility] ([facility])  Leonardo Marrero MD as PCP - PCP-[employer] ([facility])    Kolby Bearden    Note: Portions of the record may have been created with voice recognition software  Occasional wrong word or "sound a like" substitutions may have occurred due to the inherent limitations of voice recognition software  Read the chart carefully and recognize, using context, where substitutions have occurred

## 2022-09-29 NOTE — ASSESSMENT & PLAN NOTE
Occurred acutely during hospitalization for COVID in November 2021, MRI with Extensive infarcts in the left PCA territory, likely right PCA territory, bilateral cerebellar hemispheres, and right medulla  Thrombosis/occlusion of the right vertebral artery and left PCA    Thrombosis resolved on recent MRA in April  She is no longer on Eliquis and now on aspirin and statin

## 2022-09-29 NOTE — ASSESSMENT & PLAN NOTE
Noted on admission to the hospital for COVID November 2021 in right common femoral and calf veins   She completed Eliquis in April

## 2022-09-30 ENCOUNTER — OFFICE VISIT (OUTPATIENT)
Dept: FAMILY MEDICINE CLINIC | Facility: CLINIC | Age: 52
End: 2022-09-30
Payer: COMMERCIAL

## 2022-09-30 VITALS
DIASTOLIC BLOOD PRESSURE: 84 MMHG | WEIGHT: 177 LBS | RESPIRATION RATE: 17 BRPM | HEIGHT: 63 IN | HEART RATE: 78 BPM | TEMPERATURE: 96.5 F | SYSTOLIC BLOOD PRESSURE: 122 MMHG | BODY MASS INDEX: 31.36 KG/M2 | OXYGEN SATURATION: 97 %

## 2022-09-30 DIAGNOSIS — H69.81 EUSTACHIAN TUBE DYSFUNCTION, RIGHT: ICD-10-CM

## 2022-09-30 DIAGNOSIS — I63.111 CEREBROVASCULAR ACCIDENT (CVA) DUE TO EMBOLISM OF RIGHT VERTEBRAL ARTERY (HCC): Primary | ICD-10-CM

## 2022-09-30 DIAGNOSIS — H93.13 TINNITUS OF BOTH EARS: ICD-10-CM

## 2022-09-30 DIAGNOSIS — Z00.00 ANNUAL PHYSICAL EXAM: ICD-10-CM

## 2022-09-30 DIAGNOSIS — F43.21 ADJUSTMENT DISORDER WITH DEPRESSED MOOD: ICD-10-CM

## 2022-09-30 DIAGNOSIS — U07.1 DEEP VEIN THROMBOSIS (DVT) ASSOCIATED WITH COVID-19: ICD-10-CM

## 2022-09-30 DIAGNOSIS — I82.90 DEEP VEIN THROMBOSIS (DVT) ASSOCIATED WITH COVID-19: ICD-10-CM

## 2022-09-30 DIAGNOSIS — Z23 ENCOUNTER FOR IMMUNIZATION: ICD-10-CM

## 2022-09-30 DIAGNOSIS — F41.9 ANXIETY: ICD-10-CM

## 2022-09-30 DIAGNOSIS — R73.03 PREDIABETES: ICD-10-CM

## 2022-09-30 DIAGNOSIS — H35.62 RETINAL HEMORRHAGE OF LEFT EYE: ICD-10-CM

## 2022-09-30 DIAGNOSIS — Z12.11 SCREENING FOR COLORECTAL CANCER: ICD-10-CM

## 2022-09-30 DIAGNOSIS — Z12.12 SCREENING FOR COLORECTAL CANCER: ICD-10-CM

## 2022-09-30 DIAGNOSIS — R13.14 PHARYNGOESOPHAGEAL DYSPHAGIA: ICD-10-CM

## 2022-09-30 PROCEDURE — 90682 RIV4 VACC RECOMBINANT DNA IM: CPT

## 2022-09-30 PROCEDURE — 90471 IMMUNIZATION ADMIN: CPT

## 2022-09-30 PROCEDURE — 99214 OFFICE O/P EST MOD 30 MIN: CPT | Performed by: FAMILY MEDICINE

## 2022-09-30 PROCEDURE — 99396 PREV VISIT EST AGE 40-64: CPT | Performed by: FAMILY MEDICINE

## 2022-09-30 RX ORDER — ESCITALOPRAM OXALATE 10 MG/1
10 TABLET ORAL DAILY
Qty: 90 TABLET | Refills: 3 | Status: SHIPPED | OUTPATIENT
Start: 2022-09-30 | End: 2023-03-29

## 2022-09-30 RX ORDER — ATORVASTATIN CALCIUM 80 MG/1
80 TABLET, FILM COATED ORAL DAILY
Qty: 90 TABLET | Refills: 3 | Status: SHIPPED | OUTPATIENT
Start: 2022-09-30 | End: 2022-10-30

## 2022-09-30 RX ORDER — ASPIRIN 81 MG/1
81 TABLET ORAL DAILY
COMMUNITY

## 2022-09-30 RX ORDER — FLUTICASONE PROPIONATE 50 MCG
1 SPRAY, SUSPENSION (ML) NASAL 2 TIMES DAILY
Qty: 11.1 ML | Refills: 3 | Status: SHIPPED | OUTPATIENT
Start: 2022-09-30

## 2022-09-30 NOTE — PROGRESS NOTES
FAMILY MEDICINE PROGRESS NOTE    Date of Service: 22  Primary Care Provider:   Marcelina Johnson MD       Name: Obey Villalta       : 1970       Age:52 y o  Sex: female      MRN: [id number]      Chief Complaint:Follow-up       ASSESSMENT and PLAN:  Obey Villalta is a 50 y o  female with:     Problem List Items Addressed This Visit        Digestive    Pharyngoesophageal dysphagia     She had dysphagia during hospitalization in November secondary to CVA and required PEG tube  This was removed previously though she now reports sensation of liquids getting stuck when swallowing and occasional coughing/aspiration  Will obtain barium swallow to further evaluate         Relevant Orders    FL barium swallow ROUTINE esophagus       Cardiovascular and Mediastinum    Cerebrovascular accident (CVA) due to embolism of right vertebral artery ([address] ) - Primary     Occurred acutely during hospitalization for COVID in 2021, MRI with Extensive infarcts in the left PCA territory, likely right PCA territory, bilateral cerebellar hemispheres, and right medulla  Thrombosis/occlusion of the right vertebral artery and left PCA  Thrombosis resolved on recent MRA in April  She is no longer on Eliquis and now on aspirin and statin         Relevant Medications    aspirin (ECOTRIN LOW STRENGTH) 81 mg EC tablet    atorvastatin (LIPITOR) 80 mg tablet    RESOLVED: Deep vein thrombosis (DVT) associated with COVID-19     Noted on admission to the hospital for COVID 2021 in right common femoral and calf veins   She completed Eliquis in April            Nervous and Auditory    Eustachian tube dysfunction, right    Relevant Medications    fluticasone (FLONASE) 50 mcg/act nasal spray       Other    Retinal hemorrhage of left eye     Vision has been improving, she will see optho again in December          Adjustment disorder with depressed mood    Relevant Medications    escitalopram (LEXAPRO) 10 mg tablet Anxiety     Her mood is overall stable, will continue Lexapro 10 mg         Relevant Medications    escitalopram (LEXAPRO) 10 mg tablet    Prediabetes     Lab Results   Component Value Date    HGBA1C 6 0 (H) 09/19/2022    HGBA1C 6 0 09/19/2022    HGBA1C 6 3 (H) 11/16/2021     Lab Results   Component Value Date    GLUF 82 10/28/2020    [facility] 126 (H) 10/28/2020    CREATININE 0 72 10/28/2020     A1C in prediabetic range  Counseled patient on the importance of lifestyle interventions, specifically discussed a whole food, plant based diet low in saturated fat and processed foods  Discussed the importance of a diet that is rich in whole grains, fruits and vegetables, beans and legumes  Tinnitus of both ears     She continues to have tinnitus in bilateral ears, recommended audiology evaluation         Relevant Orders    Ambulatory Referral to Audiology      Other Visit Diagnoses     Annual physical exam        Screening for colorectal cancer        Relevant Orders    Ambulatory referral for Colonoscopy    Encounter for immunization        Relevant Orders    influenza vaccine, quadrivalent, recombinant, PF, 0 5 mL, for patients 18 yr+ (FLUBLOK) (Completed)          SUBJECTIVE:  Raghavendra Guillen is a 50 y o  female who presents today with a chief complaint of Follow-up  HPI     She still has some tinnitus in her ears that comes and goes  She has been following with neurology, clot has resolved as seen on MRA in April and she is no longer on Eliquis  Her strength has improved and she is getting sensation of hot/conld back on the left side  She reports that she sometimes feel like she cannot swallow liquids, feels like it gets stuck and then she coughs  She denies difficulty with solid foods  She did have PEG tube placed during hospitalization last year due to CVA  Review of Systems   Constitutional: Negative for activity change, appetite change and fever     Respiratory: Negative for shortness of breath  Cardiovascular: Negative for chest pain  Gastrointestinal: Negative for constipation and diarrhea  Dysphagia with liquids not solids   Neurological: Positive for weakness (improving) and numbness (improving)  Psychiatric/Behavioral: Negative for dysphoric mood  The patient is not nervous/anxious  I have reviewed the patient's Past Medical History  Current Outpatient Medications:     aspirin (ECOTRIN LOW STRENGTH) 81 mg EC tablet, Take 81 mg by mouth daily, Disp: , Rfl:     atorvastatin (LIPITOR) 80 mg tablet, Take 1 tablet (80 mg total) by mouth daily, Disp: 90 tablet, Rfl: 3    escitalopram (LEXAPRO) 10 mg tablet, Take 1 tablet (10 mg total) by mouth daily, Disp: 90 tablet, Rfl: 3    fluticasone (FLONASE) 50 mcg/act nasal spray, 1 spray into each nostril 2 (two) times a day, Disp: 11 1 mL, Rfl: 3    OBJECTIVE:  /84   Pulse 78   Temp (!) 96 5 °F (35 8 °C)   Resp 17   Ht 5' 2 5" (1 588 m)   Wt 80 3 kg (177 lb)   SpO2 97%   BMI 31 86 kg/m²    BP Readings from Last 3 Encounters:   09/30/22 122/84   03/28/22 100/70   02/09/22 120/80      Wt Readings from Last 3 Encounters:   09/30/22 80 3 kg (177 lb)   03/28/22 71 9 kg (158 lb 8 oz)   02/09/22 70 3 kg (155 lb)      Physical Exam  Constitutional:       General: She is not in acute distress  Appearance: Normal appearance  She is obese  She is not ill-appearing or toxic-appearing  HENT:      Head: Normocephalic and atraumatic  Right Ear: Tympanic membrane, ear canal and external ear normal       Left Ear: Tympanic membrane, ear canal and external ear normal       Nose: Nose normal       Mouth/Throat:      Mouth: Mucous membranes are moist    Eyes:      Extraocular Movements: Extraocular movements intact  Conjunctiva/sclera: Conjunctivae normal    Cardiovascular:      Rate and Rhythm: Normal rate  Pulses: Normal pulses  Heart sounds: Normal heart sounds  No murmur heard  No friction rub  Pulmonary:      Effort: Pulmonary effort is normal  No respiratory distress  Breath sounds: Normal breath sounds  No stridor  No wheezing, rhonchi or rales  Abdominal:      General: There is no distension  Palpations: Abdomen is soft  Musculoskeletal:         General: Normal range of motion  Cervical back: Normal range of motion and neck supple  No rigidity  Right lower leg: No edema  Left lower leg: No edema  Lymphadenopathy:      Cervical: No cervical adenopathy  Skin:     General: Skin is warm and dry  Findings: No erythema or rash  Neurological:      General: No focal deficit present  Mental Status: She is alert and oriented to person, place, and time  Psychiatric:         Mood and Affect: Mood normal          Behavior: Behavior normal                 Return in about 1 year (around 9/30/2023), or if symptoms worsen or fail to improve  Jacquelyn Gonzalez MD    Note: Portions of the record have been created with voice recognition software  Occasional wrong word or "sound a like" substitutions may have occurred due to the inherent limitations of voice recognition software  Read the chart carefully and recognize, using context, where substitutions have occurred

## 2022-09-30 NOTE — PATIENT INSTRUCTIONS

## 2022-09-30 NOTE — ASSESSMENT & PLAN NOTE
She had dysphagia during hospitalization in November secondary to CVA and required PEG tube  This was removed previously though she now reports sensation of liquids getting stuck when swallowing and occasional coughing/aspiration      Will obtain barium swallow to further evaluate

## 2022-09-30 NOTE — ASSESSMENT & PLAN NOTE
Lab Results   Component Value Date    HGBA1C 6 0 (H) 09/19/2022    HGBA1C 6 0 09/19/2022    HGBA1C 6 3 (H) 11/16/2021     Lab Results   Component Value Date    GLUF 82 10/28/2020    [address] (H) 10/28/2020    CREATININE 0 72 10/28/2020     A1C in prediabetic range  Counseled patient on the importance of lifestyle interventions, specifically discussed a whole food, plant based diet low in saturated fat and processed foods  Discussed the importance of a diet that is rich in whole grains, fruits and vegetables, beans and legumes

## 2023-01-03 ENCOUNTER — VBI (OUTPATIENT)
Dept: ADMINISTRATIVE | Facility: OTHER | Age: 53
End: 2023-01-03

## 2023-02-08 ENCOUNTER — OFFICE VISIT (OUTPATIENT)
Dept: AUDIOLOGY | Age: 53
End: 2023-02-08

## 2023-02-08 DIAGNOSIS — H90.3 SENSORY HEARING LOSS, BILATERAL: Primary | ICD-10-CM

## 2023-02-08 NOTE — PROGRESS NOTES
HEARING EVALUATION    Name:  Adore Chew  :  1970  Age:  48 y o  Date of Evaluation: 23     History: Tinnitus  Reason for visit: Adore Chew is being seen today at the request of Dr Sarah Sidhu for an evaluation of hearing  Patient reports bilateral tinnitus since having Covid in   She reports history of 2 strokes  Patient denies dizziness and ear pain  EVALUATION:    Otoscopic Evaluation:   Right Ear: Clear and healthy ear canal and tympanic membrane   Left Ear: Clear and healthy ear canal and tympanic membrane    Tympanometry:   Right: Type A - normal middle ear pressure and compliance   Left: Type A - normal middle ear pressure and compliance    Audiogram Results:  Borderline normal/mild sensorineural hearing loss with excellent speech discrimination ability in each ear  *see attached audiogram      RECOMMENDATIONS:  Annual hearing eval, Return to [facility]  for F/U, Copy to Patient/Caregiver and hearing aid evaluation may be considered should tinnitus become bothersome  PATIENT EDUCATION:   Discussed results and recommendations with patient and her daughter  Questions were addressed and the patient was encouraged to contact our department should concerns arise        Shu Royal   Clinical Audiologist

## 2023-03-03 ENCOUNTER — TELEPHONE (OUTPATIENT)
Dept: GASTROENTEROLOGY | Facility: CLINIC | Age: 53
End: 2023-03-03

## 2023-03-03 ENCOUNTER — PREP FOR PROCEDURE (OUTPATIENT)
Dept: GASTROENTEROLOGY | Facility: CLINIC | Age: 53
End: 2023-03-03

## 2023-03-03 DIAGNOSIS — Z12.11 SCREENING FOR COLON CANCER: Primary | ICD-10-CM

## 2023-03-03 NOTE — TELEPHONE ENCOUNTER
Scheduled date of colonoscopy (as of today):5/2/23  Physician performing colonoscopy:SAMIRA  Location of colonoscopy:[facility]  Clearances: NA

## 2023-05-01 ENCOUNTER — NURSE TRIAGE (OUTPATIENT)
Dept: OTHER | Facility: OTHER | Age: 53
End: 2023-05-01

## 2023-05-01 NOTE — TELEPHONE ENCOUNTER
Reason for Disposition   NON-URGENT call redirected to PCP's office because it is open    Protocols used: NO CONTACT OR DUPLICATE CONTACT CALL-ADULT-AH

## 2023-05-01 NOTE — TELEPHONE ENCOUNTER
Colonoscopy tomorrow and pt does not have prep instructions  There is nothing in her chart regarding type of prep  Please call back with prep asap  Please email copy to her at [email or web address]  Regarding: Colonoscopy prep questions  ----- Message from Lolita Cuenca sent at 5/1/2023 10:33 AM EDT -----  I am having a colonoscopy tomorrow and I want to know if there is any prep I need to do prior to the test   If I do not answer please email the prep instructions at [email or web address]

## 2023-05-02 DIAGNOSIS — Z12.11 SCREENING FOR COLON CANCER: Primary | ICD-10-CM

## 2023-06-20 RX ORDER — SODIUM CHLORIDE, SODIUM LACTATE, POTASSIUM CHLORIDE, CALCIUM CHLORIDE 600; 310; 30; 20 MG/100ML; MG/100ML; MG/100ML; MG/100ML
100 INJECTION, SOLUTION INTRAVENOUS CONTINUOUS
Status: CANCELLED | OUTPATIENT
Start: 2023-06-20

## 2023-06-21 ENCOUNTER — ANESTHESIA (OUTPATIENT)
Dept: GASTROENTEROLOGY | Facility: AMBULARY SURGERY CENTER | Age: 53
End: 2023-06-21

## 2023-06-21 ENCOUNTER — ANESTHESIA EVENT (OUTPATIENT)
Dept: GASTROENTEROLOGY | Facility: AMBULARY SURGERY CENTER | Age: 53
End: 2023-06-21

## 2023-06-21 ENCOUNTER — HOSPITAL ENCOUNTER (OUTPATIENT)
Dept: GASTROENTEROLOGY | Facility: AMBULARY SURGERY CENTER | Age: 53
Setting detail: OUTPATIENT SURGERY
Discharge: HOME | End: 2023-06-21
Attending: INTERNAL MEDICINE | Admitting: INTERNAL MEDICINE
Payer: COMMERCIAL

## 2023-06-21 VITALS
RESPIRATION RATE: 20 BRPM | HEART RATE: 61 BPM | BODY MASS INDEX: 33.31 KG/M2 | WEIGHT: 181 LBS | DIASTOLIC BLOOD PRESSURE: 64 MMHG | SYSTOLIC BLOOD PRESSURE: 92 MMHG | OXYGEN SATURATION: 5 % | HEIGHT: 62 IN | TEMPERATURE: 97.5 F

## 2023-06-21 DIAGNOSIS — Z12.11 SCREENING FOR COLON CANCER: ICD-10-CM

## 2023-06-21 PROCEDURE — 88305 TISSUE EXAM BY PATHOLOGIST: CPT | Performed by: PATHOLOGY

## 2023-06-21 PROCEDURE — 45385 COLONOSCOPY W/LESION REMOVAL: CPT | Performed by: INTERNAL MEDICINE

## 2023-06-21 RX ORDER — PROPOFOL 10 MG/ML
INJECTION, EMULSION INTRAVENOUS AS NEEDED
Status: DISCONTINUED | OUTPATIENT
Start: 2023-06-21 | End: 2023-06-21

## 2023-06-21 RX ORDER — SODIUM CHLORIDE, SODIUM LACTATE, POTASSIUM CHLORIDE, CALCIUM CHLORIDE 600; 310; 30; 20 MG/100ML; MG/100ML; MG/100ML; MG/100ML
100 INJECTION, SOLUTION INTRAVENOUS CONTINUOUS
Status: DISCONTINUED | OUTPATIENT
Start: 2023-06-21 | End: 2023-06-25 | Stop reason: HOSPADM

## 2023-06-21 RX ORDER — EPHEDRINE SULFATE 50 MG/ML
INJECTION INTRAVENOUS AS NEEDED
Status: DISCONTINUED | OUTPATIENT
Start: 2023-06-21 | End: 2023-06-21

## 2023-06-21 RX ORDER — PROPOFOL 10 MG/ML
INJECTION, EMULSION INTRAVENOUS CONTINUOUS PRN
Status: DISCONTINUED | OUTPATIENT
Start: 2023-06-21 | End: 2023-06-21

## 2023-06-21 RX ORDER — LIDOCAINE HYDROCHLORIDE 10 MG/ML
INJECTION, SOLUTION EPIDURAL; INFILTRATION; INTRACAUDAL; PERINEURAL AS NEEDED
Status: DISCONTINUED | OUTPATIENT
Start: 2023-06-21 | End: 2023-06-21

## 2023-06-21 RX ORDER — SODIUM CHLORIDE, SODIUM LACTATE, POTASSIUM CHLORIDE, CALCIUM CHLORIDE 600; 310; 30; 20 MG/100ML; MG/100ML; MG/100ML; MG/100ML
INJECTION, SOLUTION INTRAVENOUS CONTINUOUS PRN
Status: DISCONTINUED | OUTPATIENT
Start: 2023-06-21 | End: 2023-06-21

## 2023-06-21 RX ADMIN — PROPOFOL 150 MCG/KG/MIN: 10 INJECTION, EMULSION INTRAVENOUS at 10:05

## 2023-06-21 RX ADMIN — SODIUM CHLORIDE, SODIUM LACTATE, POTASSIUM CHLORIDE, AND CALCIUM CHLORIDE 100 ML/HR: .6; .31; .03; .02 INJECTION, SOLUTION INTRAVENOUS at 09:31

## 2023-06-21 RX ADMIN — PROPOFOL 100 MG: 10 INJECTION, EMULSION INTRAVENOUS at 10:05

## 2023-06-21 RX ADMIN — LIDOCAINE HYDROCHLORIDE 50 MG: 10 INJECTION, SOLUTION EPIDURAL; INFILTRATION; INTRACAUDAL; PERINEURAL at 10:05

## 2023-06-21 RX ADMIN — SODIUM CHLORIDE, SODIUM LACTATE, POTASSIUM CHLORIDE, AND CALCIUM CHLORIDE: .6; .31; .03; .02 INJECTION, SOLUTION INTRAVENOUS at 09:38

## 2023-06-21 RX ADMIN — EPHEDRINE SULFATE 10 MG: 50 INJECTION INTRAVENOUS at 10:23

## 2023-06-21 NOTE — ANESTHESIA POSTPROCEDURE EVALUATION
Post-Op Assessment Note    CV Status:  Stable  Pain Score: 0    Pain management: adequate     Mental Status:  Sleepy   Hydration Status:  Euvolemic   PONV Controlled:  Controlled   Airway Patency:  Patent      Post Op Vitals Reviewed: Yes      Staff: CRNA   Comments: vss sv nonobstructed uneventful        No notable events documented      BP   93/48   Temp     Pulse 63   Resp 24   SpO2 98

## 2023-06-21 NOTE — ANESTHESIA PREPROCEDURE EVALUATION
Procedure:  COLONOSCOPY    Relevant Problems   ANESTHESIA (within normal limits)      GI/HEPATIC   (+) Pharyngoesophageal dysphagia      MUSCULOSKELETAL   (+) Back pain      NEURO/PSYCH   (+) Anxiety   (+) Cerebrovascular accident (CVA) due to embolism of right vertebral artery ([address] ) (2021 - left numbness, tinnitus)      PULMONARY   (-) Sleep apnea   (-) Smoking   (-) URI (upper respiratory infection)      Other   (+) Prediabetes    BMI 33    Physical Exam    Airway    Mallampati score: II  TM Distance: >3 FB  Neck ROM: full     Dental   No notable dental hx     Cardiovascular      Pulmonary      Other Findings       Lab Results   Component Value Date    HGBA1C 6 0 (H) 09/19/2022     Anesthesia Plan  ASA Score- 3     Anesthesia Type- IV sedation with anesthesia with ASA Monitors  Additional Monitors:   Airway Plan:           Plan Factors-Exercise tolerance (METS): >4 METS  Chart reviewed  Existing labs reviewed  Patient summary reviewed  Patient is not a current smoker  Induction- intravenous  Postoperative Plan-     Informed Consent- Anesthetic plan and risks discussed with patient and daughter  I personally reviewed this patient with the CRNA  Discussed and agreed on the Anesthesia Plan with the CRNA  [place]

## 2023-06-21 NOTE — H&P
"History and Physical - [facility]  Urbano Diaz 48 y o  female MRN: [id number]                  HPI: Urbano Diaz is a 48y o  year old female who presents for colonoscopy for colon cancer screening      REVIEW OF SYSTEMS: Per the HPI, and otherwise unremarkable  Historical Information   Past Medical History:   Diagnosis Date   • Anxiety    • COVID-19 2021   • DDD (degenerative disc disease), lumbar    • Deep vein thrombosis (DVT) associated with COVID-19 2021   • Lipoma of back 10/26/2020   • Stroke [facility])      Past Surgical History:   Procedure Laterality Date   • BACK SURGERY     • BACK SURGERY      for herniated discs   •  SECTION     • LIPOMA RESECTION N/A 2020    Procedure: BACK LIPOMA EXCISION;  Surgeon: Matthew Guerrero MD;  Location: [facility];  Service: General   • TUBAL LIGATION       Social History   Social History     Substance and Sexual Activity   Alcohol Use Yes    Comment: 1 per month     Social History     Substance and Sexual Activity   Drug Use Never     Social History     Tobacco Use   Smoking Status Former   • Packs/day: 0 25   • Years: 10 00   • Total pack years: 2 50   • Types: Cigarettes   • Quit date: 1996   • Years since quittin 4   Smokeless Tobacco Never     Family History   Problem Relation Age of Onset   • Diabetes Mother    • Coronary artery disease Mother    • Lung cancer Mother    • No Known Problems Father    • Raynaud syndrome Sister    • No Known Problems Daughter    • No Known Problems Maternal Grandmother    • No Known Problems Maternal Grandfather    • No Known Problems Paternal Grandmother    • No Known Problems Paternal Grandfather    • No Known Problems Son    • No Known Problems Daughter        Meds/Allergies     (Not in a hospital admission)      No Known Allergies    Objective     Blood pressure 107/66, pulse 75, temperature (!) 96 7 °F (35 9 °C), temperature source Temporal, resp   rate 18, height 5' 2\" " (1 575 m), weight 82 1 kg (181 lb), SpO2 98 %, not currently breastfeeding        PHYSICAL EXAM    Gen: NAD  CV: RRR  CHEST: Clear  ABD: soft, NT/ND  EXT: no edema      ASSESSMENT/PLAN:  This is a 48y o  year old female here for colonoscopy

## 2023-06-26 PROCEDURE — 88305 TISSUE EXAM BY PATHOLOGIST: CPT | Performed by: PATHOLOGY

## 2023-06-29 ENCOUNTER — TELEPHONE (OUTPATIENT)
Dept: GASTROENTEROLOGY | Facility: CLINIC | Age: 53
End: 2023-06-29

## 2023-06-29 NOTE — TELEPHONE ENCOUNTER
----- Message from John Harris MD sent at 6/28/2023  6:18 PM EDT -----  Results were reviewed and 1 out of the 2 polyps removed is precancerous but not cancerous, the other polyp is benign, the recommendation remains the same repeat colonoscopy in 7 years as we discussed after the colonoscopy, please make patient aware, thanks

## 2023-09-20 DIAGNOSIS — I63.111 CEREBROVASCULAR ACCIDENT (CVA) DUE TO EMBOLISM OF RIGHT VERTEBRAL ARTERY (HCC): ICD-10-CM

## 2023-09-20 RX ORDER — ATORVASTATIN CALCIUM 80 MG/1
80 TABLET, FILM COATED ORAL DAILY
Qty: 90 TABLET | Refills: 0 | Status: SHIPPED | OUTPATIENT
Start: 2023-09-20

## 2023-11-30 DIAGNOSIS — F43.21 ADJUSTMENT DISORDER WITH DEPRESSED MOOD: ICD-10-CM

## 2023-11-30 DIAGNOSIS — F41.9 ANXIETY: ICD-10-CM

## 2023-12-04 DIAGNOSIS — F41.9 ANXIETY: ICD-10-CM

## 2023-12-04 DIAGNOSIS — F43.21 ADJUSTMENT DISORDER WITH DEPRESSED MOOD: ICD-10-CM

## 2023-12-04 DIAGNOSIS — I63.111 CEREBROVASCULAR ACCIDENT (CVA) DUE TO EMBOLISM OF RIGHT VERTEBRAL ARTERY (HCC): ICD-10-CM

## 2023-12-04 RX ORDER — ESCITALOPRAM OXALATE 10 MG/1
10 TABLET ORAL DAILY
Qty: 90 TABLET | Refills: 0 | Status: SHIPPED | OUTPATIENT
Start: 2023-12-04 | End: 2024-06-01

## 2023-12-04 RX ORDER — ATORVASTATIN CALCIUM 80 MG/1
80 TABLET, FILM COATED ORAL DAILY
Qty: 90 TABLET | Refills: 0 | Status: SHIPPED | OUTPATIENT
Start: 2023-12-04

## 2023-12-04 NOTE — TELEPHONE ENCOUNTER
Reason for call: PATIENT DOES  [facility]. 2024 SO SHE IS UNABLE TO HAVE AN APPT UNTIL [address], [address]. PLEASE APPROVE A REFILL UNTIL THEN. [x] Refill   [] Prior Auth  [] Other:     Office:   [x] PCP/Provider - [facility]  [] Specialty/Provider -     Medication: atorvastatin (LIPITOR) 80 mg tablet , escitalopram (LEXAPRO) 10 mg tablet     Quantity: 90    Pharmacy:   [facility]/pharmacy #[id number]- [place], [place] - [address] [address] [address]  Phone: [phone]  Fax: [phone]  YULY #: [id number]       Does the patient have enough for 3 days?    [x] Yes   [] No - Send as HP to POD

## 2023-12-19 RX ORDER — ESCITALOPRAM OXALATE 10 MG/1
10 TABLET ORAL DAILY
Qty: 30 TABLET | Refills: 5 | Status: SHIPPED | OUTPATIENT
Start: 2023-12-19

## 2023-12-19 RX ORDER — ESCITALOPRAM OXALATE 10 MG/1
10 TABLET ORAL DAILY
Qty: 30 TABLET | Refills: 0 | Status: SHIPPED | OUTPATIENT
Start: 2023-12-19 | End: 2023-12-19 | Stop reason: SDUPTHER

## 2023-12-19 NOTE — TELEPHONE ENCOUNTER
Transmission to pharmacy error occurred. Medication resent to pharmacy. Receipt confirmed by pharmacy.

## 2024-03-02 DIAGNOSIS — I63.111 CEREBROVASCULAR ACCIDENT (CVA) DUE TO EMBOLISM OF RIGHT VERTEBRAL ARTERY (HCC): ICD-10-CM

## 2024-03-04 RX ORDER — ATORVASTATIN CALCIUM 80 MG/1
80 TABLET, FILM COATED ORAL DAILY
Qty: 90 TABLET | Refills: 0 | Status: SHIPPED | OUTPATIENT
Start: 2024-03-04 | End: 2024-03-06 | Stop reason: SDUPTHER

## 2024-03-06 ENCOUNTER — OFFICE VISIT (OUTPATIENT)
Dept: FAMILY MEDICINE CLINIC | Facility: CLINIC | Age: 54
End: 2024-03-06
Payer: COMMERCIAL

## 2024-03-06 VITALS
HEIGHT: 62 IN | RESPIRATION RATE: 20 BRPM | BODY MASS INDEX: 34.6 KG/M2 | TEMPERATURE: 98.2 F | SYSTOLIC BLOOD PRESSURE: 134 MMHG | HEART RATE: 87 BPM | OXYGEN SATURATION: 98 % | WEIGHT: 188 LBS | DIASTOLIC BLOOD PRESSURE: 82 MMHG

## 2024-03-06 DIAGNOSIS — Z00.00 ANNUAL PHYSICAL EXAM: Primary | ICD-10-CM

## 2024-03-06 DIAGNOSIS — Z12.31 ENCOUNTER FOR SCREENING MAMMOGRAM FOR BREAST CANCER: ICD-10-CM

## 2024-03-06 DIAGNOSIS — H61.23 BILATERAL IMPACTED CERUMEN: ICD-10-CM

## 2024-03-06 DIAGNOSIS — R73.03 PREDIABETES: ICD-10-CM

## 2024-03-06 DIAGNOSIS — F41.9 ANXIETY: ICD-10-CM

## 2024-03-06 DIAGNOSIS — I63.111 CEREBROVASCULAR ACCIDENT (CVA) DUE TO EMBOLISM OF RIGHT VERTEBRAL ARTERY (HCC): ICD-10-CM

## 2024-03-06 DIAGNOSIS — F43.21 ADJUSTMENT DISORDER WITH DEPRESSED MOOD: ICD-10-CM

## 2024-03-06 PROCEDURE — 99396 PREV VISIT EST AGE 40-64: CPT | Performed by: FAMILY MEDICINE

## 2024-03-06 PROCEDURE — 69210 REMOVE IMPACTED EAR WAX UNI: CPT | Performed by: FAMILY MEDICINE

## 2024-03-06 PROCEDURE — 99213 OFFICE O/P EST LOW 20 MIN: CPT | Performed by: FAMILY MEDICINE

## 2024-03-06 RX ORDER — ESCITALOPRAM OXALATE 10 MG/1
10 TABLET ORAL DAILY
Qty: 90 TABLET | Refills: 3 | Status: SHIPPED | OUTPATIENT
Start: 2024-03-06 | End: 2025-03-01

## 2024-03-06 RX ORDER — ATORVASTATIN CALCIUM 80 MG/1
80 TABLET, FILM COATED ORAL DAILY
Qty: 90 TABLET | Refills: 3 | Status: SHIPPED | OUTPATIENT
Start: 2024-03-06

## 2024-03-06 NOTE — PROGRESS NOTES
ADULT ANNUAL PHYSICAL  [facility]    NAME: Dayanara Benson  AGE: 54 y.o. SEX: female  : 1970     DATE: 3/6/2024     Assessment and Plan:     Problem List Items Addressed This Visit        Cardiovascular and Mediastinum    Cerebrovascular accident (CVA) due to embolism of right vertebral artery (HCC)    Relevant Medications    atorvastatin (LIPITOR) 80 mg tablet       Other    Adjustment disorder with depressed mood     Started on Lexapro following stroke in .  Has been stable on Lexapro 10 mg daily.  Denies any thoughts of suicide or self-harm         Relevant Medications    escitalopram (LEXAPRO) 10 mg tablet    Anxiety    Relevant Medications    escitalopram (LEXAPRO) 10 mg tablet    Prediabetes    Relevant Orders    TSH + Free T4    Lipid panel    Hemoglobin A1C    Basic metabolic panel   Other Visit Diagnoses     Annual physical exam    -  Primary    Encounter for screening mammogram for breast cancer        Relevant Orders    Mammo screening bilateral w 3d & cad    Bilateral impacted cerumen        Relevant Orders    Ear cerumen removal (Completed)      Annual physical and medication refills completed today  Does note some difficulty with memory since having a stroke.  Is due to have a follow-up visit with neurology.  Medications refilled today.  Cerumen removal completed in office today follow-up in 1 year for annual physical  Mammogram ordered  Cervical cancer screening up-to-date  Colonoscopy up-to-date due 2030    Immunizations and preventive care screenings were discussed with patient today. Appropriate education was printed on patient's after visit summary.    Counseling:  Alcohol/drug use: discussed moderation in alcohol intake, the recommendations for healthy alcohol use, and avoidance of illicit drug use.  Dental Health: discussed importance of regular tooth brushing, flossing, and dental visits.  Injury prevention: discussed safety/seat  belts, safety helmets, smoke detectors, carbon dioxide detectors, and smoking near bedding or upholstery.  Sexual health: discussed sexually transmitted diseases, partner selection, use of condoms, avoidance of unintended pregnancy, and contraceptive alternatives.  Exercise: the importance of regular exercise/physical activity was discussed. Recommend exercise 3-5 times per week for at least 30 minutes.      Ear cerumen removal    Date/Time: 3/6/2024 4:30 PM    Performed by: Missael Guardado MD  Authorized by: Missael Guardado MD  Universal Protocol:  Consent: Verbal consent obtained.  Patient understanding: patient states understanding of the procedure being performed  Patient identity confirmed: verbally with patient    Patient location:  Clinic  Procedure details:     Location:  R ear and L ear    Procedure type: curette      Approach:  External  Post-procedure details:     Complication:  None    Hearing quality:  Improved    Patient tolerance of procedure:  Tolerated well, no immediate complications        No follow-ups on file.     Chief Complaint:     Chief Complaint   Patient presents with   • Physical Exam      History of Present Illness:     Adult Annual Physical   Patient here for a comprehensive physical exam. The patient reports no problems.    Diet and Physical Activity  Diet/Nutrition: poor diet, high fat diet, and consuming 3-5 servings of fruits/vegetables daily.   Exercise: walking.      Depression Screening  PHQ-2/9 Depression Screening    Little interest or pleasure in doing things: 0 - not at all  Feeling down, depressed, or hopeless: 0 - not at all  Trouble falling or staying asleep, or sleeping too much: 0 - not at all  Feeling tired or having little energy: 0 - not at all  Poor appetite or overeatin - not at all  Feeling bad about yourself - or that you are a failure or have let yourself or your family down: 0 - not at all  Trouble concentrating on things, such as reading the  newspaper or watching television: 0 - not at all  Moving or speaking so slowly that other people could have noticed. Or the opposite - being so fidgety or restless that you have been moving around a lot more than usual: 0 - not at all  Thoughts that you would be better off dead, or of hurting yourself in some way: 0 - not at all  PHQ-9 Score: 0  PHQ-9 Interpretation: No or Minimal depression       General Health  Sleep: sleeps well.   Hearing: normal - bilateral.Tinnitus   Vision: most recent eye exam <1 year ago and wears glasses.   Dental: no dental visits for >1 year and brushes teeth once daily.   Upper and lower partials. Doesn't wear them     /GYN Health  Follows with gynecology? yes   Patient is: postmenopausal         Review of Systems:     Review of Systems   Constitutional:  Negative for activity change, fatigue and fever.   Eyes:  Negative for visual disturbance.   Respiratory:  Negative for shortness of breath.    Cardiovascular:  Negative for chest pain.   Gastrointestinal:  Negative for abdominal pain, constipation, diarrhea and nausea.   Endocrine: Negative for cold intolerance and heat intolerance.   Musculoskeletal:  Negative for back pain and neck pain.   Skin:  Negative for rash.   Neurological:  Negative for headaches.   Psychiatric/Behavioral:  Negative for confusion and dysphoric mood. The patient is not nervous/anxious.       Past Medical History:     Past Medical History:   Diagnosis Date   • Anxiety    • COVID-19 2021   • DDD (degenerative disc disease), lumbar    • Deep vein thrombosis (DVT) associated with COVID-19 2021   • Lipoma of back 10/26/2020   • Stroke (HCC)       Past Surgical History:     Past Surgical History:   Procedure Laterality Date   • BACK SURGERY     • BACK SURGERY      for herniated discs   •  SECTION     • LIPOMA RESECTION N/A 2020    Procedure: BACK LIPOMA EXCISION;  Surgeon: Robert Bloch, MD;  Location: [facility];  Service: General    • TUBAL LIGATION        Social History:     Social History     Socioeconomic History   • Marital status: Single     Spouse name: None   • Number of children: None   • Years of education: None   • Highest education level: None   Occupational History   • None   Tobacco Use   • Smoking status: Former     Current packs/day: 0.00     Average packs/day: 0.3 packs/day for 10.0 years (2.5 ttl pk-yrs)     Types: Cigarettes     Start date: 1986     Quit date: 1996     Years since quittin.1   • Smokeless tobacco: Never   Vaping Use   • Vaping status: Never Used   Substance and Sexual Activity   • Alcohol use: Yes     Comment: 1 per month   • Drug use: Never   • Sexual activity: Not Currently     Partners: Male     Birth control/protection: None   Other Topics Concern   • None   Social History Narrative   • None     Social Determinants of Health     Financial Resource Strain: Not on file   Food Insecurity: Not on file   Transportation Needs: Not on file   Physical Activity: Not on file   Stress: Not on file   Social Connections: Not on file   Intimate Partner Violence: Not on file   Housing Stability: Not on file      Family History:     Family History   Problem Relation Age of Onset   • Diabetes Mother    • Coronary artery disease Mother    • Lung cancer Mother    • No Known Problems Father    • Raynaud syndrome Sister    • No Known Problems Daughter    • No Known Problems Maternal Grandmother    • No Known Problems Maternal Grandfather    • No Known Problems Paternal Grandmother    • No Known Problems Paternal Grandfather    • No Known Problems Son    • No Known Problems Daughter       Current Medications:     Current Outpatient Medications   Medication Sig Dispense Refill   • aspirin (ECOTRIN LOW STRENGTH) 81 mg EC tablet Take 81 mg by mouth daily     • atorvastatin (LIPITOR) 80 mg tablet Take 1 tablet (80 mg total) by mouth daily 90 tablet 3   • escitalopram (LEXAPRO) 10 mg tablet Take 1 tablet (10 mg total)  "by mouth daily 90 tablet 3   • fluticasone (FLONASE) 50 mcg/act nasal spray 1 spray into each nostril 2 (two) times a day 11.1 mL 3     No current facility-administered medications for this visit.      Allergies:     No Known Allergies   Physical Exam:     /82 (BP Location: Left arm, Patient Position: Sitting, Cuff Size: Standard)   Pulse 87   Temp 98.2 °F (36.8 °C) (Temporal)   Resp 20   Ht 5' 2\" (1.575 m)   Wt 85.3 kg (188 lb)   SpO2 98%   BMI 34.39 kg/m²     Physical Exam  Vitals and nursing note reviewed.   Constitutional:       Appearance: Normal appearance. She is well-developed.   HENT:      Head: Normocephalic and atraumatic.      Right Ear: There is impacted cerumen.      Left Ear: There is impacted cerumen.   Eyes:      Extraocular Movements: Extraocular movements intact.      Pupils: Pupils are equal, round, and reactive to light.   Cardiovascular:      Rate and Rhythm: Normal rate and regular rhythm.   Pulmonary:      Effort: Pulmonary effort is normal.      Breath sounds: Normal breath sounds.   Abdominal:      General: Bowel sounds are normal.      Palpations: Abdomen is soft.   Musculoskeletal:      Cervical back: Normal range of motion.   Skin:     General: Skin is warm and dry.   Neurological:      General: No focal deficit present.      Mental Status: She is alert and oriented to person, place, and time.      Sensory: Sensory deficit (Sensation slightly decreased on the left compared to the right.  Left side feels warmer to touch.  Strength 5/5) present.   Psychiatric:         Mood and Affect: Mood normal.         Speech: Speech normal.         Behavior: Behavior normal.          Missael Guardado MD  [facility]    "

## 2024-03-26 DIAGNOSIS — I63.111 CEREBROVASCULAR ACCIDENT (CVA) DUE TO EMBOLISM OF RIGHT VERTEBRAL ARTERY (HCC): ICD-10-CM

## 2024-03-26 RX ORDER — ATORVASTATIN CALCIUM 80 MG/1
80 TABLET, FILM COATED ORAL DAILY
Qty: 90 TABLET | Refills: 3 | Status: CANCELLED | OUTPATIENT
Start: 2024-03-26

## 2024-03-26 NOTE — TELEPHONE ENCOUNTER
Call made to [facility] and spoke with Garcia- he advised they have refill on file for patient and it will be ready in about 1 hour. I advised I would call patient and update her.       Call made back to Dayanara and a generic message was left that I spoke with Garcia about the refill she requested and that it will be ready in about 1 hour at the [facility]. IF she has nay further questions to please call back to the office.

## 2024-03-26 NOTE — TELEPHONE ENCOUNTER
Not a dup. Patient never went to  medication from 3/6 and needs a refill. Pharmacy saying it was too early to fill and would not be able to fill till 5/28.    Reason for call:   [x] Refill   [] Prior Auth  [] Other:     Office:   [x] PCP/Provider -   [] Specialty/Provider -     Medication: Lipitor    Dose/Frequency: 80 mg    Quantity: #90    Pharmacy: [facility]    Does the patient have enough for 3 days?   [x] Yes   [] No - Send as HP to POD

## 2024-04-08 ENCOUNTER — APPOINTMENT (EMERGENCY)
Dept: CT IMAGING | Facility: HOSPITAL | Age: 54
DRG: 093 | End: 2024-04-08
Payer: COMMERCIAL

## 2024-04-08 ENCOUNTER — APPOINTMENT (EMERGENCY)
Dept: RADIOLOGY | Facility: HOSPITAL | Age: 54
DRG: 093 | End: 2024-04-08
Payer: COMMERCIAL

## 2024-04-08 ENCOUNTER — HOSPITAL ENCOUNTER (INPATIENT)
Facility: HOSPITAL | Age: 54
LOS: 1 days | Discharge: HOME | DRG: 093 | End: 2024-04-09
Attending: EMERGENCY MEDICINE | Admitting: STUDENT IN AN ORGANIZED HEALTH CARE EDUCATION/TRAINING PROGRAM
Payer: COMMERCIAL

## 2024-04-08 DIAGNOSIS — R29.90 STROKE-LIKE SYMPTOM: Primary | ICD-10-CM

## 2024-04-08 DIAGNOSIS — I63.111 CEREBROVASCULAR ACCIDENT (CVA) DUE TO EMBOLISM OF RIGHT VERTEBRAL ARTERY (HCC): ICD-10-CM

## 2024-04-08 DIAGNOSIS — R20.2 ARM PARESTHESIA, LEFT: ICD-10-CM

## 2024-04-08 DIAGNOSIS — I70.90 ASVD (ARTERIOSCLEROTIC VASCULAR DISEASE): ICD-10-CM

## 2024-04-08 LAB
ANION GAP SERPL CALCULATED.3IONS-SCNC: 6 MMOL/L (ref 4–13)
APTT PPP: 31 SECONDS (ref 23–37)
BUN SERPL-MCNC: 17 MG/DL (ref 5–25)
CALCIUM SERPL-MCNC: 8.7 MG/DL (ref 8.4–10.2)
CARDIAC TROPONIN I PNL SERPL HS: <2 NG/L
CHLORIDE SERPL-SCNC: 105 MMOL/L (ref 96–108)
CO2 SERPL-SCNC: 26 MMOL/L (ref 21–32)
CREAT SERPL-MCNC: 1.05 MG/DL (ref 0.6–1.3)
ERYTHROCYTE [DISTWIDTH] IN BLOOD BY AUTOMATED COUNT: 14.7 % (ref 11.6–15.1)
FLUAV RNA RESP QL NAA+PROBE: NEGATIVE
FLUBV RNA RESP QL NAA+PROBE: NEGATIVE
GFR SERPL CREATININE-BSD FRML MDRD: 61 ML/MIN/1.73SQ M
GLUCOSE SERPL-MCNC: 91 MG/DL (ref 65–140)
HCT VFR BLD AUTO: 34.4 % (ref 34.8–46.1)
HGB BLD-MCNC: 11.3 G/DL (ref 11.5–15.4)
INR PPP: 1.01 (ref 0.84–1.19)
MCH RBC QN AUTO: 30.2 PG (ref 26.8–34.3)
MCHC RBC AUTO-ENTMCNC: 32.8 G/DL (ref 31.4–37.4)
MCV RBC AUTO: 92 FL (ref 82–98)
PLATELET # BLD AUTO: 229 THOUSANDS/UL (ref 149–390)
PLATELET # BLD AUTO: 248 THOUSANDS/UL (ref 149–390)
PMV BLD AUTO: 9.7 FL (ref 8.9–12.7)
PMV BLD AUTO: 9.9 FL (ref 8.9–12.7)
POTASSIUM SERPL-SCNC: 3.4 MMOL/L (ref 3.5–5.3)
PROTHROMBIN TIME: 14 SECONDS (ref 11.6–14.5)
RBC # BLD AUTO: 3.74 MILLION/UL (ref 3.81–5.12)
RSV RNA RESP QL NAA+PROBE: NEGATIVE
SARS-COV-2 RNA RESP QL NAA+PROBE: NEGATIVE
SODIUM SERPL-SCNC: 137 MMOL/L (ref 135–147)
WBC # BLD AUTO: 9.01 THOUSAND/UL (ref 4.31–10.16)

## 2024-04-08 PROCEDURE — 70498 CT ANGIOGRAPHY NECK: CPT

## 2024-04-08 PROCEDURE — 84484 ASSAY OF TROPONIN QUANT: CPT | Performed by: EMERGENCY MEDICINE

## 2024-04-08 PROCEDURE — 93005 ELECTROCARDIOGRAM TRACING: CPT

## 2024-04-08 PROCEDURE — 80048 BASIC METABOLIC PNL TOTAL CA: CPT | Performed by: EMERGENCY MEDICINE

## 2024-04-08 PROCEDURE — 0241U HB NFCT DS VIR RESP RNA 4 TRGT: CPT | Performed by: EMERGENCY MEDICINE

## 2024-04-08 PROCEDURE — 84484 ASSAY OF TROPONIN QUANT: CPT | Performed by: FAMILY MEDICINE

## 2024-04-08 PROCEDURE — 70496 CT ANGIOGRAPHY HEAD: CPT

## 2024-04-08 PROCEDURE — 99223 1ST HOSP IP/OBS HIGH 75: CPT | Performed by: FAMILY MEDICINE

## 2024-04-08 PROCEDURE — 85049 AUTOMATED PLATELET COUNT: CPT | Performed by: FAMILY MEDICINE

## 2024-04-08 PROCEDURE — 36415 COLL VENOUS BLD VENIPUNCTURE: CPT | Performed by: EMERGENCY MEDICINE

## 2024-04-08 PROCEDURE — 85610 PROTHROMBIN TIME: CPT | Performed by: EMERGENCY MEDICINE

## 2024-04-08 PROCEDURE — 85730 THROMBOPLASTIN TIME PARTIAL: CPT | Performed by: EMERGENCY MEDICINE

## 2024-04-08 PROCEDURE — 71045 X-RAY EXAM CHEST 1 VIEW: CPT

## 2024-04-08 PROCEDURE — 85027 COMPLETE CBC AUTOMATED: CPT | Performed by: EMERGENCY MEDICINE

## 2024-04-08 RX ORDER — ESCITALOPRAM OXALATE 10 MG/1
10 TABLET ORAL DAILY
Status: DISCONTINUED | OUTPATIENT
Start: 2024-04-09 | End: 2024-04-09 | Stop reason: HOSPADM

## 2024-04-08 RX ORDER — ASPIRIN 81 MG/1
81 TABLET, CHEWABLE ORAL ONCE
Status: COMPLETED | OUTPATIENT
Start: 2024-04-08 | End: 2024-04-08

## 2024-04-08 RX ORDER — ATORVASTATIN CALCIUM 40 MG/1
80 TABLET, FILM COATED ORAL DAILY
Status: DISCONTINUED | OUTPATIENT
Start: 2024-04-09 | End: 2024-04-09 | Stop reason: HOSPADM

## 2024-04-08 RX ORDER — ENOXAPARIN SODIUM 100 MG/ML
40 INJECTION SUBCUTANEOUS EVERY 12 HOURS SCHEDULED
Status: DISCONTINUED | OUTPATIENT
Start: 2024-04-08 | End: 2024-04-09 | Stop reason: HOSPADM

## 2024-04-08 RX ORDER — FLUTICASONE PROPIONATE 50 MCG
1 SPRAY, SUSPENSION (ML) NASAL 2 TIMES DAILY
Status: DISCONTINUED | OUTPATIENT
Start: 2024-04-08 | End: 2024-04-09 | Stop reason: HOSPADM

## 2024-04-08 RX ORDER — POTASSIUM CHLORIDE 20 MEQ/1
40 TABLET, EXTENDED RELEASE ORAL ONCE
Status: COMPLETED | OUTPATIENT
Start: 2024-04-08 | End: 2024-04-08

## 2024-04-08 RX ORDER — CLOPIDOGREL BISULFATE 75 MG/1
75 TABLET ORAL DAILY
Status: DISCONTINUED | OUTPATIENT
Start: 2024-04-09 | End: 2024-04-09 | Stop reason: HOSPADM

## 2024-04-08 RX ORDER — ASPIRIN 81 MG/1
81 TABLET, CHEWABLE ORAL DAILY
Status: DISCONTINUED | OUTPATIENT
Start: 2024-04-09 | End: 2024-04-08

## 2024-04-08 RX ORDER — CLOPIDOGREL BISULFATE 75 MG/1
300 TABLET ORAL ONCE
Status: COMPLETED | OUTPATIENT
Start: 2024-04-08 | End: 2024-04-08

## 2024-04-08 RX ADMIN — ASPIRIN 81 MG: 81 TABLET, CHEWABLE ORAL at 19:39

## 2024-04-08 RX ADMIN — POTASSIUM CHLORIDE 40 MEQ: 1500 TABLET, EXTENDED RELEASE ORAL at 22:20

## 2024-04-08 RX ADMIN — FLUTICASONE PROPIONATE 1 SPRAY: 50 SPRAY, METERED NASAL at 23:44

## 2024-04-08 RX ADMIN — ENOXAPARIN SODIUM 40 MG: 40 INJECTION SUBCUTANEOUS at 23:45

## 2024-04-08 RX ADMIN — CLOPIDOGREL 300 MG: 75 TABLET ORAL at 19:39

## 2024-04-08 RX ADMIN — IOHEXOL 85 ML: 350 INJECTION, SOLUTION INTRAVENOUS at 18:37

## 2024-04-08 NOTE — ED PROVIDER NOTES
"History  Chief Complaint   Patient presents with    Numbness     Left arm tingling starting 3 hours prior to arrival, patient reports symptoms have greatly improved at time of arrival. Patient reports \"a little\" left hand tingling present upon arrival. Hx of previous stroke with baseline right sided facial droop at baseline, +ASA, GCS 15 upon arrival     54 y.o. F presents w stroke like symptoms.  Acute onset at 1400 today.    Patient has a history of a stroke in the past, with residual deficit, right facial droop.    Patient was concerned today because she had mild left facial tingling, left upper extremity numbness.  She had a more pronounced right facial droop according to family.    On EMS arrival, patient was profoundly hypertensive.  Throughout her EMS transport, the patient's profound hypertension resolved.  Patient is presenting with improved strokelike symptoms, her right facial droop has returned to her normal right facial droop, but she remains with left facial tingling and left upper extremity weakness.          Prior to Admission Medications   Prescriptions Last Dose Informant Patient Reported? Taking?   aspirin (ECOTRIN LOW STRENGTH) 81 mg EC tablet  Self Yes No   Sig: Take 81 mg by mouth daily   atorvastatin (LIPITOR) 80 mg tablet   No No   Sig: Take 1 tablet (80 mg total) by mouth daily   escitalopram (LEXAPRO) 10 mg tablet   No No   Sig: Take 1 tablet (10 mg total) by mouth daily   fluticasone (FLONASE) 50 mcg/act nasal spray  Self No No   Si spray into each nostril 2 (two) times a day      Facility-Administered Medications: None       Past Medical History:   Diagnosis Date    Anxiety     COVID-19 2021    DDD (degenerative disc disease), lumbar     Deep vein thrombosis (DVT) associated with COVID-19 2021    Lipoma of back 10/26/2020    Stroke (HCC)        Past Surgical History:   Procedure Laterality Date    BACK SURGERY      BACK SURGERY      for herniated discs     " SECTION      LIPOMA RESECTION N/A 2020    Procedure: BACK LIPOMA EXCISION;  Surgeon: Robert Bloch, MD;  Location: [facility];  Service: General    TUBAL LIGATION         Family History   Problem Relation Age of Onset    Diabetes Mother     Coronary artery disease Mother     Lung cancer Mother     No Known Problems Father     Raynaud syndrome Sister     No Known Problems Daughter     No Known Problems Maternal Grandmother     No Known Problems Maternal Grandfather     No Known Problems Paternal Grandmother     No Known Problems Paternal Grandfather     No Known Problems Son     No Known Problems Daughter      I have reviewed and agree with the history as documented.    E-Cigarette/Vaping    E-Cigarette Use Never User      E-Cigarette/Vaping Substances    Nicotine No     THC No     CBD No     Flavoring No     Other No     Unknown No      Social History     Tobacco Use    Smoking status: Former     Current packs/day: 0.00     Average packs/day: 0.3 packs/day for 10.0 years (2.5 ttl pk-yrs)     Types: Cigarettes     Start date: 1986     Quit date: 1996     Years since quittin.2    Smokeless tobacco: Never   Vaping Use    Vaping status: Never Used   Substance Use Topics    Alcohol use: Yes     Comment: 1 per month    Drug use: Never       Review of Systems   Constitutional:  Negative for chills and fever.   HENT:  Negative for congestion and rhinorrhea.    Respiratory:  Negative for chest tightness and shortness of breath.    Cardiovascular:  Negative for chest pain and leg swelling.   Gastrointestinal:  Negative for abdominal pain, nausea and vomiting.   Musculoskeletal:  Negative for back pain and neck pain.   Skin:  Negative for wound.   Neurological:  Positive for facial asymmetry and numbness. Negative for dizziness, seizures, syncope, speech difficulty, weakness, light-headedness and headaches.       Physical Exam  Physical Exam  Vitals reviewed.   Constitutional:       General: She is not in  acute distress.     Appearance: She is well-developed. She is not diaphoretic.   HENT:      Head: Normocephalic and atraumatic.   Eyes:      General: No scleral icterus.        Right eye: No discharge.         Left eye: No discharge.      Conjunctiva/sclera: Conjunctivae normal.      Pupils: Pupils are equal, round, and reactive to light.   Neck:      Vascular: No JVD.   Cardiovascular:      Rate and Rhythm: Normal rate and regular rhythm.      Heart sounds: Normal heart sounds. No murmur heard.     No friction rub. No gallop.   Pulmonary:      Effort: Pulmonary effort is normal. No respiratory distress.      Breath sounds: Normal breath sounds. No wheezing or rales.   Chest:      Chest wall: No tenderness.   Abdominal:      General: Bowel sounds are normal. There is no distension.      Palpations: Abdomen is soft.      Tenderness: There is no abdominal tenderness. There is no guarding.   Musculoskeletal:         General: No tenderness or deformity. Normal range of motion.      Cervical back: Normal range of motion and neck supple.   Skin:     General: Skin is warm and dry.      Coloration: Skin is not pale.      Findings: No erythema or rash.   Neurological:      Mental Status: She is alert and oriented to person, place, and time.      Cranial Nerves: No cranial nerve deficit.      Sensory: Sensory deficit present.      Motor: No weakness.      Comments: Right-sided facial droop, baseline.  Left facial numbness.  Left upper extremity numbness   Psychiatric:         Behavior: Behavior normal.         Vital Signs  ED Triage Vitals   Temperature Pulse Respirations Blood Pressure SpO2   04/08/24 1825 04/08/24 1825 04/08/24 1825 04/08/24 1825 04/08/24 1825   97.8 °F (36.6 °C) 59 12 157/80 96 %      Temp Source Heart Rate Source Patient Position - Orthostatic VS BP Location FiO2 (%)   04/08/24 1825 04/08/24 1825 04/08/24 1825 -- --   Temporal Monitor Lying        Pain Score       04/08/24 2045       No Pain            Vitals:    04/08/24 2045 04/08/24 2100 04/08/24 2115 04/08/24 2130   BP: 119/81 121/77 120/85    Pulse: 63 59 65 64   Patient Position - Orthostatic VS:             Visual Acuity  Visual Acuity      Flowsheet Row Most Recent Value   L Pupil Size (mm) 3   R Pupil Size (mm) 3            ED Medications  Medications   iohexol (OMNIPAQUE) 350 MG/ML injection (MULTI-DOSE) 85 mL (85 mL Intravenous Given 4/8/24 1837)   aspirin chewable tablet 81 mg (81 mg Oral Given 4/8/24 1939)   clopidogrel (PLAVIX) tablet 300 mg (300 mg Oral Given 4/8/24 1939)   potassium chloride (Klor-Con M20) CR tablet 40 mEq (40 mEq Oral Given 4/8/24 2220)       Diagnostic Studies  Results Reviewed       Procedure Component Value Units Date/Time    HS Troponin I 4hr [[id number]]     Lab Status: No result Specimen: Blood     FLU/RSV/COVID - if FLU/RSV clinically relevant [[id number]]  (Normal) Collected: 04/08/24 1847    Lab Status: Final result Specimen: Nares from Nose Updated: 04/08/24 1947     SARS-CoV-2 Negative     INFLUENZA A PCR Negative     INFLUENZA B PCR Negative     RSV PCR Negative    Narrative:      FOR PEDIATRIC PATIENTS - copy/paste COVID Guidelines URL to browser: [email or web address]    SARS-CoV-2 assay is a Nucleic Acid Amplification assay intended for the  qualitative detection of nucleic acid from SARS-CoV-2 in nasopharyngeal  swabs. Results are for the presumptive identification of SARS-CoV-2 RNA.    Positive results are indicative of infection with SARS-CoV-2, the virus  causing COVID-19, but do not rule out bacterial infection or co-infection  with other viruses. Laboratories within the [place] and its  territories are required to report all positive results to the appropriate  public health authorities. Negative results do not preclude SARS-CoV-2  infection and should not be used as the sole basis for treatment or other  patient management decisions. Negative  results must be combined with  clinical observations, patient history, and epidemiological information.  This test has not been FDA cleared or approved.    This test has been authorized by FDA under an Emergency Use Authorization  (EUA). This test is only authorized for the duration of time the  declaration that circumstances exist justifying the authorization of the  emergency use of an in vitro diagnostic tests for detection of SARS-CoV-2  virus and/or diagnosis of COVID-19 infection under section 564(b)(1) of  the Act, 21 U.S.C. 360bbb-3(b)(1), unless the authorization is terminated  or revoked sooner. The test has been validated but independent review by FDA  and CLIA is pending.    Test performed using [employer] GeneXpert: This RT-PCR assay targets N2,  a region unique to SARS-CoV-2. A conserved region in the E-gene was chosen  for pan-Sarbecovirus detection which includes SARS-CoV-2.    According to CMS-2020-01-R, this platform meets the definition of high-throughput technology.    HS Troponin 0hr (reflex protocol) [[id number]]  (Normal) Collected: 04/08/24 1847    Lab Status: Final result Specimen: Blood from Arm, Right Updated: 04/08/24 1929     hs TnI 0hr <2 ng/L     HS Troponin I 2hr [[id number]]     Lab Status: No result Specimen: Blood     Protime-INR [[id number]]  (Normal) Collected: 04/08/24 1847    Lab Status: Final result Specimen: Blood from Arm, Right Updated: 04/08/24 1922     Protime 14.0 seconds      INR 1.01    APTT [[id number]]  (Normal) Collected: 04/08/24 1847    Lab Status: Final result Specimen: Blood from Arm, Right Updated: 04/08/24 1922     PTT 31 seconds     Basic metabolic panel [[id number]]  (Abnormal) Collected: 04/08/24 1847    Lab Status: Final result Specimen: Blood from Arm, Right Updated: 04/08/24 1921     Sodium 137 mmol/L      Potassium 3.4 mmol/L      Chloride 105 mmol/L      CO2 26 mmol/L      ANION GAP 6 mmol/L      BUN 17 mg/dL      Creatinine 1.05 mg/dL      Glucose 91 mg/dL       Calcium 8.7 mg/dL      eGFR 61 ml/min/1.73sq m     Narrative:      National Kidney Disease Foundation guidelines for Chronic Kidney Disease (CKD):     Stage 1 with normal or high GFR (GFR > 90 mL/min/1.73 square meters)    Stage 2 Mild CKD (GFR = 60-89 mL/min/1.73 square meters)    Stage 3A Moderate CKD (GFR = 45-59 mL/min/1.73 square meters)    Stage 3B Moderate CKD (GFR = 30-44 mL/min/1.73 square meters)    Stage 4 Severe CKD (GFR = 15-29 mL/min/1.73 square meters)    Stage 5 End Stage CKD (GFR <15 mL/min/1.73 square meters)  Note: GFR calculation is accurate only with a steady state creatinine    CBC and Platelet [[id number]]  (Abnormal) Collected: 04/08/24 1847    Lab Status: Final result Specimen: Blood from Arm, Right Updated: 04/08/24 1903     WBC 9.01 Thousand/uL      RBC 3.74 Million/uL      Hemoglobin 11.3 g/dL      Hematocrit 34.4 %      MCV 92 fL      MCH 30.2 pg      MCHC 32.8 g/dL      RDW 14.7 %      Platelets 229 Thousands/uL      MPV 9.9 fL                    XR stroke alert portable chest   Final Result by Cruz Karimi MD (04/08 1919)      No acute cardiopulmonary disease.            Workstation performed: [id number]         CTA stroke alert (head/neck)   Final Result by Cruz Karimi MD (04/08 1914)         1. Nondominant right vertebral artery. Multifocal moderate narrowing in the distal V3 segment and atretic, partially occluded V4 segment could represent dissection.   2. No stenosis or occlusion of the major vessels of the [place].            Findings were directly discussed with Alicia Linares  at  7:00 PM  .                           Workstation performed: [id number]         CT stroke alert brain   Final Result by Cruz Karimi MD (04/08 1914)         1. Chronic infarcts in the left PCA territory.   2. No intracranial hemorrhage or mass effect.      Findings were directly discussed with Alicia Linares  at   6:55 PM  .      Workstation performed: [id number]                     Procedures  Procedures         ED Course  ED Course as of 04/08/24 2237   Mon Apr 08, 2024   1833 Stroke alert called on patient eval.    1921 Discussed with Vijay stroke call.  CT head negative, CTA with right vert likely chronic occlusion, seen in 2021.  Recommend aspirin 81 mg daily.  Plavix load 300, decrease to 75 mg daily.  Admit for routine MRI.  SBP goal 140, max 180.  Stroke protocol.                  Stroke Assessment       Row Name 04/08/24 1842             NIH Stroke Scale    Interval Baseline      Level of Consciousness (1a.) 0      LOC Questions (1b.) 0      LOC Commands (1c.) 0      Best Gaze (2.) 0      Visual (3.) 0      Facial Palsy (4.) 1  Baseline      Motor Arm, Left (5a.) 0      Motor Arm, Right (5b.) 0      Motor Leg, Left (6a.) 0      Motor Leg, Right (6b.) 0      Limb Ataxia (7.) 0      Sensory (8.) 1      Best Language (9.) 0      Dysarthria (10.) 0      Extinction and Inattention (11.) (Formerly Neglect) 0      Total 2                    Flowsheet Row Most Recent Value   Thrombolytic Decision Options    Thrombolytic Decision Patient not a candidate.   Patient is not a candidate options Unclear time of onset outside appropriate time window.                                    Medical Decision Making  54-year-old female, strokelike symptoms, hypertensive urgency/emergency.  Stroke workup.  Plan for admission to stroke pathway.    Amount and/or Complexity of Data Reviewed  Labs: ordered.  Radiology: ordered.    Risk  OTC drugs.  Prescription drug management.  Decision regarding hospitalization.             Disposition  Final diagnoses:   Stroke-like symptom     Time reflects when diagnosis was documented in both MDM as applicable and the Disposition within this note       Time User Action Codes Description Comment    4/8/2024  6:32 PM Carmen Harper Add [R29.90] Stroke-like symptom     4/8/2024  9:51 PM Tony Lincoln Add [I70.90] ASVD (arteriosclerotic vascular  disease)     4/8/2024  9:51 PM ShaneromanaTony Add [R20.2] Arm paresthesia, left           ED Disposition       ED Disposition   Admit    Condition   Stable    Date/Time   Mon Apr 8, 2024  9:29 PM    Comment   Case was discussed with [place] and the patient's admission status was agreed to be Admission Status: inpatient status to the service of Dr. Kilpatrick .               Follow-up Information    None         Patient's Medications   Discharge Prescriptions    No medications on file       No discharge procedures on file.    PDMP Review       None            ED Provider  Electronically Signed by             Carmen Harper DO  04/08/24 6684

## 2024-04-08 NOTE — QUICK NOTE
Stroke alert called: 631 PM  Neurology response immediate  LKW: 2 pm   NIHSS 2 for facial droop (chronic from prior stroke) and sensory changes right- rapidly improving symptoms based on ED assessment/ exam relayed to me    CT H stat not acute  CTA H/N with right vertebral artery V3/4 occlusion chronic based on [facility] neurology notes.. no LVO. I reviewed this personally in PACS and with radiologist  Prasad no just outside of TNK window    Per chart review patient with remote stroke 2021 with right vert occlusion V3/4 intraluminal thrombus and Left PCA P2 occlusion in 2021 briefly on anticoagulation as stroke attributed to COVID related hypercoagulable, and then transition to ASA daily.    Concern for acute ischemic stroke vs recrudescence of prior stroke in the setting of potential acute stressor.  - Recommend admit under stroke protocol  - ASA and Plavix load then ASA 81 mg and Plavix 75 mg daily for now   - Routine MRI brain  - Consider thrombosis panel- I do not see a prior in our system or care everywhere  - Further infectious metabolic work up per primary team  - Permissive HTN ideally 140-210 SBP   - Daytime neurology consultation

## 2024-04-09 ENCOUNTER — APPOINTMENT (INPATIENT)
Dept: MRI IMAGING | Facility: HOSPITAL | Age: 54
DRG: 093 | End: 2024-04-09
Payer: COMMERCIAL

## 2024-04-09 ENCOUNTER — APPOINTMENT (INPATIENT)
Dept: NON INVASIVE DIAGNOSTICS | Facility: HOSPITAL | Age: 54
DRG: 093 | End: 2024-04-09
Payer: COMMERCIAL

## 2024-04-09 VITALS
OXYGEN SATURATION: 94 % | HEIGHT: 62 IN | DIASTOLIC BLOOD PRESSURE: 81 MMHG | SYSTOLIC BLOOD PRESSURE: 118 MMHG | WEIGHT: 182.1 LBS | BODY MASS INDEX: 33.51 KG/M2 | TEMPERATURE: 97.8 F | HEART RATE: 77 BPM | RESPIRATION RATE: 16 BRPM

## 2024-04-09 LAB
ANION GAP SERPL CALCULATED.3IONS-SCNC: 6 MMOL/L (ref 4–13)
ATRIAL RATE: 58 BPM
BUN SERPL-MCNC: 14 MG/DL (ref 5–25)
CALCIUM SERPL-MCNC: 8.9 MG/DL (ref 8.4–10.2)
CARDIAC TROPONIN I PNL SERPL HS: <2 NG/L
CARDIAC TROPONIN I PNL SERPL HS: <2 NG/L
CHLORIDE SERPL-SCNC: 109 MMOL/L (ref 96–108)
CHOLEST SERPL-MCNC: 140 MG/DL
CO2 SERPL-SCNC: 28 MMOL/L (ref 21–32)
CREAT SERPL-MCNC: 0.81 MG/DL (ref 0.6–1.3)
ERYTHROCYTE [DISTWIDTH] IN BLOOD BY AUTOMATED COUNT: 14.8 % (ref 11.6–15.1)
EST. AVERAGE GLUCOSE BLD GHB EST-MCNC: 120 MG/DL
GFR SERPL CREATININE-BSD FRML MDRD: 83 ML/MIN/1.73SQ M
GLUCOSE SERPL-MCNC: 92 MG/DL (ref 65–140)
GLUCOSE SERPL-MCNC: 95 MG/DL (ref 65–140)
HBA1C MFR BLD: 5.8 %
HCT VFR BLD AUTO: 36.6 % (ref 34.8–46.1)
HDLC SERPL-MCNC: 59 MG/DL
HGB BLD-MCNC: 11.9 G/DL (ref 11.5–15.4)
LDLC SERPL CALC-MCNC: 65 MG/DL (ref 0–100)
MCH RBC QN AUTO: 30.2 PG (ref 26.8–34.3)
MCHC RBC AUTO-ENTMCNC: 32.5 G/DL (ref 31.4–37.4)
MCV RBC AUTO: 93 FL (ref 82–98)
P AXIS: 51 DEGREES
PLATELET # BLD AUTO: 227 THOUSANDS/UL (ref 149–390)
PMV BLD AUTO: 9.9 FL (ref 8.9–12.7)
POTASSIUM SERPL-SCNC: 4 MMOL/L (ref 3.5–5.3)
PR INTERVAL: 164 MS
QRS AXIS: 22 DEGREES
QRSD INTERVAL: 92 MS
QT INTERVAL: 454 MS
QTC INTERVAL: 445 MS
RBC # BLD AUTO: 3.94 MILLION/UL (ref 3.81–5.12)
SODIUM SERPL-SCNC: 143 MMOL/L (ref 135–147)
T WAVE AXIS: 16 DEGREES
TRIGL SERPL-MCNC: 80 MG/DL
VENTRICULAR RATE: 58 BPM
WBC # BLD AUTO: 7.49 THOUSAND/UL (ref 4.31–10.16)

## 2024-04-09 PROCEDURE — 85027 COMPLETE CBC AUTOMATED: CPT | Performed by: FAMILY MEDICINE

## 2024-04-09 PROCEDURE — 80048 BASIC METABOLIC PNL TOTAL CA: CPT | Performed by: FAMILY MEDICINE

## 2024-04-09 PROCEDURE — 99239 HOSP IP/OBS DSCHRG MGMT >30: CPT | Performed by: STUDENT IN AN ORGANIZED HEALTH CARE EDUCATION/TRAINING PROGRAM

## 2024-04-09 PROCEDURE — 84484 ASSAY OF TROPONIN QUANT: CPT | Performed by: FAMILY MEDICINE

## 2024-04-09 PROCEDURE — 97161 PT EVAL LOW COMPLEX 20 MIN: CPT

## 2024-04-09 PROCEDURE — 99255 IP/OBS CONSLTJ NEW/EST HI 80: CPT | Performed by: STUDENT IN AN ORGANIZED HEALTH CARE EDUCATION/TRAINING PROGRAM

## 2024-04-09 PROCEDURE — 80061 LIPID PANEL: CPT | Performed by: FAMILY MEDICINE

## 2024-04-09 PROCEDURE — 97165 OT EVAL LOW COMPLEX 30 MIN: CPT

## 2024-04-09 PROCEDURE — 70551 MRI BRAIN STEM W/O DYE: CPT

## 2024-04-09 PROCEDURE — 36415 COLL VENOUS BLD VENIPUNCTURE: CPT | Performed by: FAMILY MEDICINE

## 2024-04-09 PROCEDURE — 93010 ELECTROCARDIOGRAM REPORT: CPT | Performed by: INTERNAL MEDICINE

## 2024-04-09 PROCEDURE — 82948 REAGENT STRIP/BLOOD GLUCOSE: CPT

## 2024-04-09 PROCEDURE — 83036 HEMOGLOBIN GLYCOSYLATED A1C: CPT | Performed by: FAMILY MEDICINE

## 2024-04-09 RX ORDER — LIDOCAINE 50 MG/G
1 PATCH TOPICAL DAILY
Status: DISCONTINUED | OUTPATIENT
Start: 2024-04-09 | End: 2024-04-09 | Stop reason: HOSPADM

## 2024-04-09 RX ORDER — ATORVASTATIN CALCIUM 80 MG/1
80 TABLET, FILM COATED ORAL DAILY
Qty: 30 TABLET | Refills: 0 | Status: SHIPPED | OUTPATIENT
Start: 2024-04-09

## 2024-04-09 RX ORDER — LIDOCAINE 50 MG/G
1 PATCH TOPICAL DAILY
Qty: 14 PATCH | Refills: 0 | Status: SHIPPED | OUTPATIENT
Start: 2024-04-10

## 2024-04-09 RX ORDER — CLOPIDOGREL BISULFATE 75 MG/1
75 TABLET ORAL DAILY
Qty: 30 TABLET | Refills: 0 | Status: SHIPPED | OUTPATIENT
Start: 2024-04-10

## 2024-04-09 RX ADMIN — ENOXAPARIN SODIUM 40 MG: 40 INJECTION SUBCUTANEOUS at 08:05

## 2024-04-09 RX ADMIN — ASPIRIN 81 MG: 81 TABLET, COATED ORAL at 08:05

## 2024-04-09 RX ADMIN — ATORVASTATIN CALCIUM 80 MG: 40 TABLET, FILM COATED ORAL at 08:05

## 2024-04-09 RX ADMIN — FLUTICASONE PROPIONATE 1 SPRAY: 50 SPRAY, METERED NASAL at 08:06

## 2024-04-09 RX ADMIN — ESCITALOPRAM OXALATE 10 MG: 10 TABLET ORAL at 08:05

## 2024-04-09 RX ADMIN — LIDOCAINE 1 PATCH: 50 PATCH CUTANEOUS at 12:19

## 2024-04-09 RX ADMIN — CLOPIDOGREL 75 MG: 75 TABLET ORAL at 08:05

## 2024-04-09 NOTE — PHYSICAL THERAPY NOTE
Physical Therapy Evaluation     Patient's Name: Dayanara Benson    Admitting Diagnosis  Numbness [R20.0]    Problem List  Patient Active Problem List   Diagnosis    Back pain    Cerebrovascular accident (CVA) due to embolism of right vertebral artery (HCC)    Retinal hemorrhage of left eye    Pharyngoesophageal dysphagia    Adjustment disorder with depressed mood    Anxiety    Eustachian tube dysfunction, right    Prediabetes    Tinnitus of both ears    Arm paresthesia, left    ASVD (arteriosclerotic vascular disease)     Past Medical History  Past Medical History:   Diagnosis Date    Anxiety     COVID-19 2021    DDD (degenerative disc disease), lumbar     Deep vein thrombosis (DVT) associated with COVID-19 2021    Lipoma of back 10/26/2020    Stroke (HCC)      Past Surgical History  Past Surgical History:   Procedure Laterality Date    BACK SURGERY      BACK SURGERY      for herniated discs     SECTION      LIPOMA RESECTION N/A 2020    Procedure: BACK LIPOMA EXCISION;  Surgeon: Robert Bloch, MD;  [facility];  Service: General    TUBAL LIGATION        24 0738   PT Last Visit   PT Visit Date 24   Note Type   Note type Evaluation   Pain Assessment   Pain Assessment Tool 0-10   Pain Score No Pain   Restrictions/Precautions   Weight Bearing Precautions Per Order No   Other Precautions Multiple lines;Telemetry   Home Living   Type of Home House   Home Layout Two level;1/2 bath on main level;Stairs to enter with rails  (6-8 TREY; flight of stairs to 2nd floor)   Bathroom Shower/Tub Tub/shower unit   Bathroom Toilet Standard   Bathroom Equipment Other (Comment)  (none per patient)   Bathroom Accessibility Accessible   Home Equipment Other (Comment)  (none per patient)   Additional Comments Pt ambulates without an AD.   Prior Function   Level of [place] Independent with functional mobility;Independent with ADLs;Independent with IADLS   Lives With Son;Family  "  Receives Help From Family   IADLs Independent with driving;Independent with medication management;Family/Friend/Other provides meals   Falls in the last 6 months 1 to 4  (1 fall)   Vocational Full time employment   General   Family/Caregiver Present No   Cognition   Overall Cognitive Status WFL   Arousal/Participation Alert   Orientation Level Oriented X4   Memory Within functional limits   Following Commands Follows all commands and directions without difficulty   Comments Pt agreeable to PT.   Subjective   Subjective \"I feel good.\"   RLE Assessment   RLE Assessment WFL   LLE Assessment   LLE Assessment WFL   Light Touch   RLE Light Touch Grossly intact   LLE Light Touch Grossly intact   Bed Mobility   Supine to Sit 6  Modified independent   Additional items HOB elevated   Sit to Supine 6  Modified independent   Additional items HOB elevated   Transfers   Sit to Stand 7  Independent   Stand to Sit 7  Independent   Ambulation/Elevation   Gait pattern WNL   Gait Assistance 7  Independent   Assistive Device None   Distance 200 feet   Stair Management Assistance 6  Modified independent   Additional items Verbal cues   Stair Management Technique One rail R;Alternating pattern;Foreward   Number of Stairs 6   Balance   Static Sitting Normal   Dynamic Sitting Normal   Static Standing Good   Dynamic Standing Good   Ambulatory Good   Endurance Deficit   Endurance Deficit No   Activity Tolerance   Activity Tolerance Patient tolerated treatment well   Medical Staff Made Aware care coordination with OT   Assessment   Prognosis Good   Problem List   (no acute PT impairments)   Assessment Pt is 54 year old female seen for PT evaluation s/p admit to [facility] on 4/8/2024 with numbness. PT consulted to assess pt's functional mobility and discharge needs. Order placed for PT evaluation and treatment, with up and out of bed as tolerated order. Comorbidities affecting pt's physical performance at time of assessment include " anxiety, left arm paresthesia, and arteriosclerotic vascular disease. Prior to hospitalization, pt was independent with all functional mobility without an AD. Pt ambulates unrestricted distances on all terrain and elevations. Pt resides with her family, in a two level house with 6-8 steps to enter. Personal factors affecting pt at time of initial evaluation include lives in a two story house and stairs to enter home. Please find objective findings from PT assessment regarding body systems outlined above. The following objective measures were performed on initial evaluation Barthel Index: 100/100, Modified Js: 0 no symptoms at all, and AM-PAC 6-Clicks: 24/24. Pt's clinical presentation is currently stable seen in pt's presentation of need for ongoing medical management/monitoring. Pt is independent with transfers and functional mobility. From a PT standpoint, recommendation at time of discharge would be home with no post acute rehabilitation needs. Pt with no acute PT impairments identified. Pt is at her baseline functional status. Plan to discontinue PT at this time.   Barriers to Discharge None   Goals   Patient Goals to go home   Plan   PT Frequency Other (Comment)  (discontinue PT)   Discharge Recommendation   Rehab Resource Intensity Level, PT No post-acute rehabilitation needs   AM-PAC Basic Mobility Inpatient   Turning in Flat Bed Without Bedrails 4   Lying on Back to Sitting on Edge of Flat Bed Without Bedrails 4   Moving Bed to Chair 4   Standing Up From Chair Using Arms 4   Walk in Room 4   Climb 3-5 Stairs With Railing 4   Basic Mobility Inpatient Raw Score 24   Basic Mobility Standardized Score 57.68   [facility] Highest Level Of Mobility   -HL Goal 8: Walk 250 feet or more   -HLM Achieved 7: Walk 25 feet or more   Modified [place] Scale   Modified Js Scale 0   Barthel Index   Feeding 10   Bathing 5   Grooming Score 5   Dressing Score 10   Bladder Score 10   Bowels Score 10   Toilet Use  Score 10   Transfers (Bed/Chair) Score 15   Mobility (Level Surface) Score 15   Stairs Score 10   Barthel Index Score 100     PT Evaluation Time: 2053-1743  Lulu Lowe, PT, DPT

## 2024-04-09 NOTE — PLAN OF CARE
Problem: Neurological Deficit  Goal: Neurological status is stable or improving  Description: Interventions:  - Monitor and assess patient's level of consciousness, motor function, sensory function, and level of assistance needed for ADLs.   - Monitor and report changes from baseline. Collaborate with interdisciplinary team to initiate plan and implement interventions as ordered.   - Provide and maintain a safe environment.  - Consider seizure precautions.  - Consider fall precautions.  - Consider aspiration precautions.  - Consider bleeding precautions.  Outcome: Adequate for Discharge

## 2024-04-09 NOTE — ASSESSMENT & PLAN NOTE
Patient with anxiety disorder.  Is on a medicine at home that the daughter cannot quite remember.  Will give her hydroxyzine as needed until we can find that medicine.

## 2024-04-09 NOTE — UTILIZATION REVIEW
"Initial Clinical Review    Admission: Date/Time/Statement:   Admission Orders (From admission, onward)       Ordered        04/08/24 2129  INPATIENT ADMISSION  Once                          Orders Placed This Encounter   Procedures    INPATIENT ADMISSION     Standing Status:   Standing     Number of Occurrences:   1     Order Specific Question:   Level of Care     Answer:   Med Surg [16]     Order Specific Question:   Estimated length of stay     Answer:   More than 2 Midnights     Order Specific Question:   Certification     Answer:   I certify that inpatient services are medically necessary for this patient for a duration of greater than two midnights. See H&P and MD Progress Notes for additional information about the patient's course of treatment.     ED Arrival Information       Expected   -    Arrival   4/8/2024 18:20    Acuity   Emergent              Means of arrival   Ambulance    Escorted by   [facility]   Hospitalist    Admission type   Emergency              Arrival complaint   CVA             Chief Complaint   Patient presents with    Numbness     Left arm tingling starting 3 hours prior to arrival, patient reports symptoms have greatly improved at time of arrival. Patient reports \"a little\" left hand tingling present upon arrival. Hx of previous stroke with baseline right sided facial droop at baseline, +ASA, GCS 15 upon arrival       Initial Presentation: 54 y.o. female to ED from [employer] [facility] via EMS w/    PMH of anxiety and prior CVA who presents with arm numbness.  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  It reminded her of numbness she had during her prior stroke which she had occurred on the whole left side of her body.   labs wnl , K slightly low . CTA neg . Admitted IP status w/ L arm pares thesis . Plan for neuro consult , MRI in am , cont antiplatelet therapy and statin . Symptoms have resolved. Anxiety give hydroxyzine " prn .       ED Triage Vitals   Temperature Pulse Respirations Blood Pressure SpO2   04/08/24 1825 04/08/24 1825 04/08/24 1825 04/08/24 1825 04/08/24 1825   97.8 °F (36.6 °C) 59 12 157/80 96 %      Temp Source Heart Rate Source Patient Position - Orthostatic VS BP Location FiO2 (%)   04/08/24 1825 04/08/24 1825 04/08/24 1825 04/08/24 2300 --   Temporal Monitor Lying Right arm       Pain Score       04/08/24 2045       No Pain          Wt Readings from Last 1 Encounters:   04/08/24 86.5 kg (190 lb 11.2 oz)     Additional Vital Signs:   Date/Time Temp Pulse Resp BP MAP (mmHg) SpO2 O2 Device Patient Position - Orthostatic VS   04/09/24 0800 -- 63 18  111/77  91 97 %  None (Room air)  Lying    Resp: Simultaneous filing. User may not have seen previous data. at 04/09/24 0800   BP: Simultaneous filing. User may not have seen previous data. at 04/09/24 0800   SpO2: Simultaneous filing. User may not have seen previous data. at 04/09/24 0800   O2 Device: Simultaneous filing. User may not have seen previous data. at 04/09/24 0800   Patient Position - Orthostatic VS: Simultaneous filing. User may not have seen previous data. at 04/09/24 0800   04/09/24 0700 97.8 °F (36.6 °C) 54 Abnormal  13 120/88 98 96 % -- --   04/09/24 0600 -- 58 13 101/77 86 96 % None (Room air) Lying   04/09/24 0500 -- 57 14 107/74 86 94 % None (Room air) Lying   04/09/24 0400 -- 59 17 98/67 79 94 % None (Room air) Lying   04/09/24 0319 -- 76 14 92/57 68 94 % None (Room air) Lying   04/09/24 0200 -- 51 Abnormal  12 112/74 88 94 % None (Room air) Lying   04/09/24 0100 -- 57 12 113/76 90 95 % None (Room air) Lying   04/09/24 0000 -- 59 13 113/72 87 95 % None (Room air) Lying   04/08/24 2300 -- 73 22 131/82 103 96 % None (Room air) Lying   04/08/24 2230 -- 58 19 121/82 98 96 % -- --   04/08/24 2130 -- 64 20 -- -- 95 % -- --   04/08/24 2115 -- 65 18 120/85 99 94 % -- --   04/08/24 2100 -- 59 20 121/77 95 95 % -- --   04/08/24 2045 -- 63 20 119/81 96 96 % -- --    04/08/24 2030 -- 61 20 123/79 97 94 % -- --   04/08/24 2015 -- -- 16 119/77 -- 97 % -- --   04/08/24 2000 -- 57 16 125/79 97 95 % -- --   04/08/24 1945 -- 72 16 119/77 -- 95 % -- --   04/08/24 1930 -- 62 16 148/86 -- 93 % -- --   04/08/24 1925 -- 64 16 127/80 -- 100 % -- --   04/08/24 1915 -- 60 16 148/86 -- 100 % None (Room air) Lying   04/08/24 1900 -- 64 16 127/80 101 100 % -- --   04/08/24 1840 -- 60 16 136/82 -- 98 % -- --     Pertinent Labs/Diagnostic Test Results:   4/8 EKG Sinus bradycardia  Low voltage QRS  Borderline ECG    4/9 MRI No acute infarction, edema, or mass effect.   XR stroke alert portable chest   Final Result by Cruz Karimi MD (04/08 1919)      No acute cardiopulmonary disease.            Workstation performed: [id number]         CTA stroke alert (head/neck)   Final Result by Cruz Karimi MD (04/08 1914)         1. Nondominant right vertebral artery. Multifocal moderate narrowing in the distal V3 segment and atretic, partially occluded V4 segment could represent dissection.   2. No stenosis or occlusion of the major vessels of the [place].            Findings were directly discussed with Alicia Linares  at  7:00 PM  .                           Workstation performed: [id number]         CT stroke alert brain   Final Result by Cruz Karimi MD (04/08 1914)         1. Chronic infarcts in the left PCA territory.   2. No intracranial hemorrhage or mass effect.      Findings were directly discussed with Alicia Linares  at   6:55 PM  .      Workstation performed: [id number]         MRI brain wo contrast    (Results Pending)     Results from last 7 days   Lab Units 04/08/24  1847   SARS-COV-2  Negative     Results from last 7 days   Lab Units 04/09/24  0555 04/08/24  2341 04/08/24 1847   WBC Thousand/uL 7.49  --  9.01   HEMOGLOBIN g/dL 11.9  --  11.3*   HEMATOCRIT % 36.6  --  34.4*   PLATELETS Thousands/uL 227 881 229         Results from last 7 days   Lab Units  04/09/24  0555 04/08/24  1847   SODIUM mmol/L 143 137   POTASSIUM mmol/L 4.0 3.4*   CHLORIDE mmol/L 109* 105   CO2 mmol/L 28 26   ANION GAP mmol/L 6 6   BUN mg/dL 14 17   CREATININE mg/dL 0.81 1.05   EGFR ml/min/1.73sq m 83 61   CALCIUM mg/dL 8.9 8.7         Results from last 7 days   Lab Units 04/09/24  0741   POC GLUCOSE mg/dl 95     Results from last 7 days   Lab Units 04/09/24  0555 04/08/24  1847   GLUCOSE RANDOM mg/dL 92 91         Results from last 7 days   Lab Units 04/09/24  0555   HEMOGLOBIN A1C % 5.8*   EAG mg/dl 120     Results from last 7 days   Lab Units 04/09/24  0751 04/08/24  2341 04/08/24  1847   HS TNI 0HR ng/L  --   --  <2   HS TNI 2HR ng/L  --  <2  --    HS TNI 4HR ng/L <2  --   --        Results from last 7 days   Lab Units 04/08/24  1847   PROTIME seconds 14.0   INR  1.01   PTT seconds 31       Results from last 7 days   Lab Units 04/08/24  1847   INFLUENZA A PCR  Negative   INFLUENZA B PCR  Negative   RSV PCR  Negative       ED Treatment:   Medication Administration from 04/08/2024 1820 to 04/09/2024 0909         Date/Time Order Dose Route Action     04/08/2024 1939 EDT aspirin chewable tablet 81 mg 81 mg Oral Given     04/08/2024 1939 EDT clopidogrel (PLAVIX) tablet 300 mg 300 mg Oral Given     04/09/2024 0805 EDT aspirin (ECOTRIN LOW STRENGTH) EC tablet 81 mg 81 mg Oral Given     04/09/2024 0805 EDT atorvastatin (LIPITOR) tablet 80 mg 80 mg Oral Given     04/09/2024 0805 EDT escitalopram (LEXAPRO) tablet 10 mg 10 mg Oral Given     04/09/2024 0806 EDT fluticasone (FLONASE) 50 mcg/act nasal spray 1 spray 1 spray Nasal Given     04/08/2024 2344 EDT fluticasone (FLONASE) 50 mcg/act nasal spray 1 spray 1 spray Nasal Given     04/09/2024 0805 EDT enoxaparin (LOVENOX) subcutaneous injection 40 mg 40 mg Subcutaneous Given     04/08/2024 2345 EDT enoxaparin (LOVENOX) subcutaneous injection 40 mg 40 mg Subcutaneous Given     04/09/2024 0805 EDT clopidogrel (PLAVIX) tablet 75 mg 75 mg Per NG Tube  Given     04/08/2024 2220 EDT potassium chloride (Klor-Con M20) CR tablet 40 mEq 40 mEq Oral Given          Past Medical History:   Diagnosis Date    Anxiety     COVID-19 12/20/2021    DDD (degenerative disc disease), lumbar     Deep vein thrombosis (DVT) associated with COVID-19 12/20/2021    Lipoma of back 10/26/2020    Stroke (HCC)      Present on Admission:   Anxiety      Admitting Diagnosis: Numbness [R20.0]  Age/Sex: 54 y.o. female  Admission Orders:  Scheduled Medications:  aspirin, 81 mg, Oral, Daily  atorvastatin, 80 mg, Oral, Daily  clopidogrel, 75 mg, Per NG Tube, Daily  enoxaparin, 40 mg, Subcutaneous, Q12H RJ  escitalopram, 10 mg, Oral, Daily  fluticasone, 1 spray, Nasal, BID      Continuous IV Infusions:     PRN Meds:     Tele   PT OT ST   Cardiac diet   Neuro checks  Up and OOB     IP CONSULT TO NEUROLOGY  IP CONSULT TO NEUROLOGY  IP CONSULT TO CASE MANAGEMENT  IP CONSULT TO NUTRITION SERVICES    Network Utilization Review Department  ATTENTION: Please call with any questions or concerns to [phone] and carefully listen to the prompts so that you are directed to the right person. All voicemails are confidential.   For Discharge needs, contact Care Management DC Support Team at [phone] opt. [phone]  Send all requests for admission clinical reviews, approved or denied determinations and any other requests to dedicated fax number below belonging to the campus where the patient is receiving treatment. List of dedicated fax numbers for the Facilities:  FACILITY NAME UR FAX NUMBER   ADMISSION DENIALS (Administrative/Medical Necessity) [phone]   DISCHARGE SUPPORT TEAM (NETWORK) [phone]   PARENT CHILD HEALTH (Maternity/NICU/Pediatrics) [phone]   [facility] [phone]   [facility] [phone]   [facility] [phone]   [facility] [phone]   Critical access hospital  [phone]   [facility] [phone]   [facility] [phone]   [facility] [phone]   [facility] [phone]   [facility] [phone]   [facility] [phone]   [facility] [phone]

## 2024-04-09 NOTE — CASE MANAGEMENT
Case Management Assessment & Discharge Planning Note    Patient name Dayanara Benson  Location /-01 MRN [id number]  : 1970 Date 2024       Current Admission Date: 2024  Current Admission Diagnosis:Arm paresthesia, left   Patient Active Problem List    Diagnosis Date Noted    Arm paresthesia, left 2024    ASVD (arteriosclerotic vascular disease) 2024    Prediabetes 2022    Tinnitus of both ears 2022    Cerebrovascular accident (CVA) due to embolism of right vertebral artery (HCC) 2021    Retinal hemorrhage of left eye 2021    Pharyngoesophageal dysphagia 2021    Adjustment disorder with depressed mood 2021    Anxiety 2021    Eustachian tube dysfunction, right 2021    Back pain 2020      LOS (days): 1  Geometric Mean LOS (GMLOS) (days):   Days to GMLOS:     OBJECTIVE:    Risk of Unplanned Readmission Score: 7.87         Current admission status: Inpatient       Preferred Pharmacy:   [id number] [place], [place] [facility] [address]  [address] [address]  Phone: [phone] Fax: [phone]    Primary Care Provider: Missael Guardado MD    Primary Insurance: BLUE CROSS  Secondary Insurance:     ASSESSMENT:  Active Health Care Proxies       [facility] Representative - Daughter   Primary Phone: [phone] (Mobile)                 Advance Directives  Does patient have a Health Care POA?: No  Was patient offered paperwork?: Yes (not interested)  Does patient currently have a Health Care decision maker?: Yes, please see Health Care Proxy section  Does patient have Advance Directives?: No  Was patient offered paperwork?: Yes         Readmission Root Cause  30 Day Readmission: No    Patient Information  Admitted from:: Home  Mental Status: Alert  During Assessment patient was accompanied by: Not accompanied during assessment  Assessment information provided by:: Patient  Primary  Caregiver: Self  Support Systems: Son, Daughter  County of Residence: [place]  What city do you live in?: [place]  Home entry access options. Select all that apply.: Stairs  Number of steps to enter home.: 7  Do the steps have railings?: Yes  Type of Current Residence: [address] home  Upon entering residence, is there a bedroom on the main floor (no further steps)?: Yes  Upon entering residence, is there a bathroom on the main floor (no further steps)?: Yes  Living Arrangements: Lives w/ Son, Lives w/ Daughter  Is patient a ?: No    Activities of Daily Living Prior to Admission  Functional Status: Independent  Completes ADLs independently?: Yes  Ambulates independently?: Yes  Does patient use assisted devices?: No  Does patient currently own DME?: No  Does patient have a history of Outpatient Therapy (PT/OT)?: No  Does the patient have a history of Short-Term Rehab?: No  Does patient have a history of HHC?: Yes  Does patient currently have HHC?: No         Patient Information Continued  Income Source: Employed  Does patient have prescription coverage?: Yes  Does patient receive dialysis treatments?: No  Does patient have a history of substance abuse?: No  Does patient have a history of Mental Health Diagnosis?: Yes (anxiety)  Is patient receiving treatment for mental health?: Yes  Has patient received inpatient treatment related to mental health in the last 2 years?: No         Means of Transportation  Means of Transport to Appts:: Drives Self      Social Determinants of Health (SDOH)      Flowsheet Row Most Recent Value   Housing Stability    In the last 12 months, was there a time when you were not able to pay the mortgage or rent on time? N   In the last 12 months, how many places have you lived? 1   In the last 12 months, was there a time when you did not have a steady place to sleep or slept in a shelter (including now)? N   Transportation Needs    In the past 12 months, has lack of transportation  kept you from medical appointments or from getting medications? no   In the past 12 months, has lack of transportation kept you from meetings, work, or from getting things needed for daily living? No   Food Insecurity    Within the past 12 months, you worried that your food would run out before you got the money to buy more. Never true   Within the past 12 months, the food you bought just didn't last and you didn't have money to get more. Never true   Utilities    In the past 12 months has the electric, gas, oil, or water company threatened to shut off services in your home? No            DISCHARGE DETAILS:    Discharge planning discussed with:: patient  Freedom of Choice: Yes  Comments - Freedom of Choice: No anticipated DC needs  CM contacted family/caregiver?: No- see comments                  Requested Home Health Care         Is the patient interested in HHC at discharge?: No    DME Referral Provided  Referral made for DME?: No    Other Referral/Resources/Interventions Provided:  Referral Comments: No anticipated DC needs         Treatment Team Recommendation: Home  Discharge Destination Plan:: Home  Transport at Discharge : Automobile, Family

## 2024-04-09 NOTE — CONSULTS
Consultation - Neurology   Dayanara Benson 54 y.o. female MRN: [id number]  Unit/Bed#: -01 Encounter: [id number]      Assessment/Plan     * Arm paresthesia, left  Assessment & Plan  55 y/o female with prior stroke with residual R facial droop on aspirin, anxiety, DDD, who presented with complaints of L facial/UE numbness/tingling and worsening of chronic R facial droop. Upon EMS arrival, patient was reportedly profoundly hypertensive. Her BP improved while en route to the hospital. Upon arrival to ED, patient's facial droop had returned to baseline but she continued with L sensory changes. BP on presentation 157/80. Stroke alert was initiated. NIHSS 2. Patient was deemed not a TNK candidate.  Unclear etiology at this time.  Exam nonfocal.  Workup as noted below.    Workup:  - CT head:  1. Chronic infarcts in the left PCA territory.  2. No intracranial hemorrhage or mass effect.  - CTA head and neck:  1. Nondominant right vertebral artery. Multifocal moderate narrowing in the distal V3 segment and atretic, partially occluded V4 segment could represent dissection.  2. No stenosis or occlusion of the major vessels of the [place].  - MRI brain: No acute infarction, edema, or mass effect.   - Labs: 65, A1C 5.8    Plan:  - Stroke pathway  S/p Plavix 300 mg x 1  Per attending neurologist-continue DAPT with aspirin 81 mg and Plavix 75 mg x 21 days, then transition to Plavix monotherapy  Atorvastatin 80 mg per home regimen  Goal normotension; avoid hypotension  Continue telemetry  PT/OT/ST  Frequent neuro checks. Continue to monitor and notify neurology with any changes.   - Lidocaine patch for left shoulder pain  - Medical management and supportive care per primary team. Correction of any metabolic or infectious disturbances.   - No further inpatient neurology recommendations at this time.  Please call with any questions.      Dayanara Benson will need follow up in in 6 weeks with neurovascular attending or  advance practitioner. She will not require outpatient neurological testing.    Case and treatment plan reviewed with attending neurologist, Dr. Del Toro. Please see attending attestation for any further recommendations.    History of Present Illness     Reason for Consult / Principal Problem: Stroke-like symptoms  HPI: Dayanara Benson is a 54 y.o.  female with prior stroke with residual R facial droop on aspirin, anxiety, DDD, who presents with  stroke-like symptoms.    Patient seen and evaluated with the attending neurologist.  Patient reports she had sharp L shoulder pain and then started having tingling in the LUE that radiated down into her hand. She denies any recent neck injuries or neck issues from prior. Currently, she still has some tingling in her L hand/fingertips that goes away when she moves it. She denies any neck pain right now. She does not recall her symptoms from her prior stroke. She denies any alcohol, tobacco, or recreational drug use. She is on aspirin 81 mg at baseline.    Patient presented on 4/8 with L facial/UE numbness/tingling and family felt that her chronic R facial droop was worse. Per ED notes, upon EMS arrival, patient was noted to be profoundly hypertensive. Patient's BP improved while en route to the hospital and upon arrival to ED, patient's facial droop was back to baseline, although she still had L facial/UE numbness/tingling. Stroke alert was initiated in ED. NIHSS 2. Patient was deemed not a TNK candidate. She was admitted for further evaluation and stroke pathway.    Patient was seen by [facility] in 04/2022 for stroke follow up. Stroke history was noted as: patient was hospitalized in 11/2021 for COVID pneumonia. Patient was subsequently found to have multiple areas of evolving acute-subaute infarcts, mainly in L PCA region. Imaging demonstrated R vert occlusion and L vertebral thrombus. Patient was started on Lovenox for AC. MRI brain then completed and showed infarcts in  the L thalamus, L occipital/temporal regions, R cerebellum, R periventricular white matter, R medial thalamus, R occipital lobe, L cerebellum, and R medulla. Etiology was suspected to be hypercoagulable state secondary to COVID. Patient was ultimately transitioned to Eliquis. At the time of the follow up visit, patient noted improvement in her L eye but she still saw floaters in it. It noted that patient had L macular hemorrhage previously while on AC. Patient also endorsed numbness/tingling on the L side. Patient was continued on Eliquis at that time with plans to repeat MRI/MRA and if thrombus in vert that was previously noted was resolved, then would consider transitioning to aspirin from Eliquis. Patient had MRI/MRA completed later in month and due to thrombus resolving, she was transitioned to aspirin and Eliquis was dc'd.    Inpatient consult to Neurology  Consult performed by: CHRISTY Banks  Consult ordered by: Tony Lincoln MD      Consult to Neurology  Consult performed by: CHRISTY Banks  Consult ordered by: Carmen Harper DO          Review of Systems  A 12 system ROS was completed. Other than the above mentioned complaints in the HPI and those commented on below, all remaining systems were negative.    Historical Information   Past Medical History:   Diagnosis Date    Anxiety     COVID-19 2021    DDD (degenerative disc disease), lumbar     Deep vein thrombosis (DVT) associated with COVID-19 2021    Lipoma of back 10/26/2020    Stroke (HCC)      Past Surgical History:   Procedure Laterality Date    BACK SURGERY      BACK SURGERY      for herniated discs     SECTION      LIPOMA RESECTION N/A 2020    Procedure: BACK LIPOMA EXCISION;  Surgeon: Robert Bloch, MD;  Location: [facility];  Service: General    TUBAL LIGATION       Social History   Social History     Substance and Sexual Activity   Alcohol Use Yes    Comment: 1 per month     Social History      Substance and Sexual Activity   Drug Use Never     E-Cigarette/Vaping    E-Cigarette Use Never User      E-Cigarette/Vaping Substances    Nicotine No     THC No     CBD No     Flavoring No     Other No     Unknown No      Social History     Tobacco Use   Smoking Status Former    Current packs/day: 0.00    Average packs/day: 0.3 packs/day for 10.0 years (2.5 ttl pk-yrs)    Types: Cigarettes    Start date: 1986    Quit date: 1996    Years since quittin.2   Smokeless Tobacco Never     Family History:   Family History   Problem Relation Age of Onset    Diabetes Mother     Coronary artery disease Mother     Lung cancer Mother     No Known Problems Father     Raynaud syndrome Sister     No Known Problems Daughter     No Known Problems Maternal Grandmother     No Known Problems Maternal Grandfather     No Known Problems Paternal Grandmother     No Known Problems Paternal Grandfather     No Known Problems Son     No Known Problems Daughter        Review of previous medical records was completed.     Meds/Allergies   all current active meds have been reviewed, current meds:   Current Facility-Administered Medications   Medication Dose Route Frequency    aspirin (ECOTRIN LOW STRENGTH) EC tablet 81 mg  81 mg Oral Daily    atorvastatin (LIPITOR) tablet 80 mg  80 mg Oral Daily    clopidogrel (PLAVIX) tablet 75 mg  75 mg Per NG Tube Daily    enoxaparin (LOVENOX) subcutaneous injection 40 mg  40 mg Subcutaneous Q12H RJ    escitalopram (LEXAPRO) tablet 10 mg  10 mg Oral Daily    fluticasone (FLONASE) 50 mcg/act nasal spray 1 spray  1 spray Nasal BID    lidocaine (LIDODERM) 5 % patch 1 patch  1 patch Topical Daily   , and PTA meds:   Prior to Admission Medications   Prescriptions Last Dose Informant Patient Reported? Taking?   aspirin (ECOTRIN LOW STRENGTH) 81 mg EC tablet 2024 at 900 Self Yes Yes   Sig: Take 81 mg by mouth daily   atorvastatin (LIPITOR) 80 mg tablet 2024 at 800  No Yes   Sig: Take 1 tablet  "(80 mg total) by mouth daily   escitalopram (LEXAPRO) 10 mg tablet 2024 at 900  No Yes   Sig: Take 1 tablet (10 mg total) by mouth daily   fluticasone (FLONASE) 50 mcg/act nasal spray 2024 at 900 Self No Yes   Si spray into each nostril 2 (two) times a day      Facility-Administered Medications: None       No Known Allergies    Objective   Vitals:Blood pressure 125/82, pulse 73, temperature 98.5 °F (36.9 °C), temperature source Oral, resp. rate 16, height 5' 2\" (1.575 m), weight 82.6 kg (182 lb 1.6 oz), SpO2 94%, not currently breastfeeding.,Body mass index is 33.31 kg/m².  No intake or output data in the 24 hours ending 24 1406    Invasive Devices:   Invasive Devices       Peripheral Intravenous Line  Duration             Peripheral IV 24 Left;Proximal;Ventral (anterior) Forearm <1 day    Peripheral IV 24 Proximal;Right;Ventral (anterior) Forearm <1 day              Drain  Duration             Closed/Suction Drain Midline Back Bulb 10 Fr. 1226 days                    Physical and neurologic exam performed by attending neurologist:  Physical Exam  Vitals and nursing note reviewed.   Constitutional:       General: She is not in acute distress.     Appearance: Normal appearance. She is not ill-appearing.   HENT:      Head: Normocephalic.      Mouth/Throat:      Mouth: Mucous membranes are moist.      Pharynx: Oropharynx is clear.   Eyes:      General: No scleral icterus.        Right eye: No discharge.         Left eye: No discharge.      Extraocular Movements: EOM normal.      Conjunctiva/sclera: Conjunctivae normal.   Cardiovascular:      Rate and Rhythm: Normal rate.   Pulmonary:      Effort: Pulmonary effort is normal. No respiratory distress.   Musculoskeletal:         General: Tenderness (L shoulder) present. Normal range of motion.      Cervical back: Normal range of motion. No tenderness.   Skin:     General: Skin is warm and dry.      Coloration: Skin is not jaundiced or pale. " "  Neurological:      Mental Status: She is alert and oriented to person, place, and time.   Psychiatric:         Mood and Affect: Mood normal.       Neurologic Exam     Mental Status   Oriented to person, place, and time.   Level of consciousness: alert  Able to follow commands appropriately. No aphasia or dysarthria noted.     Cranial Nerves     CN III, IV, VI   Extraocular motions are normal.     CN V   Facial sensation intact.     CN VII   Facial expression full, symmetric.     CN VIII   Hearing: intact    CN IX, X   Palate: symmetric    CN XI   CN XI normal.     CN XII   CN XII normal.     Motor Exam   Muscle bulk: normal  Full strength throughout bilateral UE/LE     Sensory Exam   Light touch normal.     Gait, Coordination, and Reflexes     Tremor   Resting tremor: absent  Intention tremor: absent      Lab Results: I have personally reviewed pertinent reports.  , CBC:   Results from last 7 days   Lab Units 04/09/24  0555 04/08/24  2341 04/08/24  1847   WBC Thousand/uL 7.49  --  9.01   RBC Million/uL 3.94  --  3.74*   HEMOGLOBIN g/dL 11.9  --  11.3*   HEMATOCRIT % 36.6  --  34.4*   MCV fL 93  --  92   PLATELETS Thousands/uL 227 248 229   , BMP/CMP:   Results from last 7 days   Lab Units 04/09/24  0555 04/08/24  1847   SODIUM mmol/L 143 137   POTASSIUM mmol/L 4.0 3.4*   CHLORIDE mmol/L 109* 105   CO2 mmol/L 28 26   BUN mg/dL 14 17   CREATININE mg/dL 0.81 1.05   CALCIUM mg/dL 8.9 8.7   EGFR ml/min/1.73sq m 83 61   , Vitamin B12:   , HgBA1C:   Results from last 7 days   Lab Units 04/09/24  0555   HEMOGLOBIN A1C % 5.8*   , TSH:   , Coagulation:   Results from last 7 days   Lab Units 04/08/24  1847   INR  1.01   , Lipid Profile:   Results from last 7 days   Lab Units 04/09/24  0555   HDL mg/dL 59   LDL CALC mg/dL 65   TRIGLYCERIDES mg/dL 80   , Ammonia:   , Urinalysis:       Invalid input(s): \"URIBILINOGEN\", Drug Screen:   , Medication Drug Levels:       Invalid input(s): \"CARBAMAZEPINE\", \"OXCARBAZEPINE\"    Imaging " Studies: I have personally reviewed pertinent reports.   and I have personally reviewed pertinent films in PACS  EKG, Pathology, and Other Studies: I have personally reviewed pertinent reports.    VTE Prophylaxis: Enoxaparin (Lovenox)    Code Status: Level 1 - Full Code  Advance Directive and Living Will:      Power of :    POLST:

## 2024-04-09 NOTE — SPEECH THERAPY NOTE
Speech Language/Pathology      Patient passed nursing dysphagia screen; presently tolerating oral diet.  Need for formal evaluation of swallow function is not indicated at this time.  Please re-order should status change or concerns arise.     Ida Oliva MS, CCC-SLP  Speech-Language Pathologist  [place] #[id number]  [place] #[id number]

## 2024-04-09 NOTE — DISCHARGE SUMMARY
[facility]  Discharge- Dayanara Benson 1970, 54 y.o. female MRN: [id number]  Unit/Bed#: -01 Encounter: [id number]  Primary Care Provider: Missael Guardado MD   Date and time admitted to hospital: 4/8/2024  6:20 PM    * Arm paresthesia, left  Assessment & Plan  Patient with a history of a left-sided stroke presented with left upper extremity paresthesias.  She presented the emergency room and had neuroimaging done which did not show acute disease to correlate with her symptoms.  Patient reports that she was working at [employer] and suddenly started having left shoulder pain associate with left upper extremity numbness and tingling.  Reports symptoms have resolved and currently she feels back to her normal self.    HS troponin - x 3  CBC, BMP within normal limits.  CT head report noted negative for acute finding, chronic infarct in the left PCA territory.  CTA head and neck report noted with nondominant right vertebral artery, multifocal moderate narrowing in the distal V3 segment and partially occluded V4 segment which was also evident in CTA in 2021.  Brain MRI report noted negative for acute finding.    Currently she is asymptomatic and reports feeling back to her normal self and eager to go home.  Unclear etiology of her symptoms however currently asymptomatic.  Discussed with neurology and recommendation is to continue dual antiplatelets aspirin 81 mg daily, Plavix 75 mg daily for 21 days thereafter transition to Plavix and continue statin.  Recommend outpatient follow-up with primary care provider as well as primary neurology.  Currently hemodynamically stable for discharge.    ASVD (arteriosclerotic vascular disease)  Assessment & Plan  Patient a prior history of a stroke also left side and has only vague persistence of her symptoms since that time.  Continue dual antiplatelet therapy and statin as above.  Continue patient follow-up with PCP.    Anxiety  Assessment &  Plan  Patient with anxiety disorder.   Patient is on Lexapro.  Continue outpatient follow-up with PCP.        Discharging Physician / Practitioner: Radames James MD  PCP: Missael Guardado MD  Admission Date:   Admission Orders (From admission, onward)       Ordered        04/08/24 2129  INPATIENT ADMISSION  Once                          Discharge Date: 04/09/24    Medical Problems       Resolved Problems  Date Reviewed: 4/9/2024   None         Consultations During Hospital Stay:  Neurology        Reason for Admission: Left arm paresthesia    Hospital Course:     Dayanara Benson is a 54 y.o. female patient with past medical history of anxiety, morbid obesity, previous CVA, DDD, who originally presented to the hospital on 4/8/2024 due to complaining of left upper extremity numbness and tingling.  She reports that she was working at [employer] or part and suddenly started having left shoulder pain which was followed up with left upper extremity numbness and tingling which resolved without any intervention.  Workup essentially unrevealing including brain MRI negative for acute stroke.  CTA head and neck report noted chronic finding as above.  Currently she is asymptomatic and eager to go home.   Shoulder arthritis may be playing a role as well.  Discussed with neurology and recommendation is to continue dual antiplatelet with aspirin and Plavix for 21 days thereafter transition to Plavix along with continuation of statin with outpatient follow-up with primary neurology.  Patient and daughter both are in agreement with all plan.  Currently she is hemodynamically stable for discharge with close outpatient follow-up with primary care provider as well as primary neurologist.  No other events reported.  Refer to earlier notes for further clarification.      Please see above list of diagnoses and related plan for additional information.     Condition at Discharge: good     Discharge Day Visit / Exam:     Subjective:  "Seen during rounds.  Appears comfortable not in distress.  Currently she denies chest pain, dyspnea, fever, chills, nausea, vomiting, dysarthria, denies left upper extremity numbness, tingling, weakness, any other new neurological complaints.  Reports feeling back to her normal self and eager to go home.    Vitals: Blood Pressure: 125/82 (04/09/24 1300)  Pulse: 73 (04/09/24 1214)  Temperature: 98.5 °F (36.9 °C) (04/09/24 1300)  Temp Source: Oral (04/09/24 1300)  Respirations: 16 (04/09/24 1300)  Height: 5' 2\" (157.5 cm) (04/09/24 1225)  Weight - Scale: 82.6 kg (182 lb 1.6 oz) (04/09/24 1225)  SpO2: 94 % (04/09/24 1214)  Exam:   Physical Exam  Constitutional:       General: She is not in acute distress.     Appearance: Normal appearance. She is obese. She is not ill-appearing, toxic-appearing or diaphoretic.   HENT:      Head: Normocephalic and atraumatic.   Eyes:      Pupils: Pupils are equal, round, and reactive to light.   Cardiovascular:      Rate and Rhythm: Normal rate.      Pulses: Normal pulses.   Pulmonary:      Effort: Pulmonary effort is normal. No respiratory distress.      Breath sounds: Normal breath sounds. No wheezing.   Abdominal:      General: Bowel sounds are normal. There is no distension.      Palpations: Abdomen is soft.      Tenderness: There is no abdominal tenderness.   Musculoskeletal:      Cervical back: No rigidity.      Right lower leg: No edema.      Left lower leg: No edema.   Neurological:      Mental Status: She is alert and oriented to person, place, and time. Mental status is at baseline.      Comments: No facial asymmetry noted.  Muscle strength equal and symmetric in all 4 extremity 5 out of 5.  No gross focal motor deficit noted on exam.   Psychiatric:      Comments: Anxious        Discussion with Family: Spoke with daughter Jenelle  over the phone at length.    Discharge instructions/Information to patient and family:   See after visit summary for information provided to patient " and family.      Provisions for Follow-Up Care:  See after visit summary for information related to follow-up care and any pertinent home health orders.      Disposition:     Home        Planned Readmission:      Discharge Statement:  I spent 35 minutes discharging the patient. This time was spent on the day of discharge. I had direct contact with the patient on the day of discharge. Greater than 50% of the total time was spent examining patient, answering all patient questions, arranging and discussing plan of care with patient as well as directly providing post-discharge instructions.  Additional time then spent on discharge activities.    Discharge Medications:  See after visit summary for reconciled discharge medications provided to patient and family.      ** Please Note: This note has been constructed using a voice recognition system **

## 2024-04-09 NOTE — ASSESSMENT & PLAN NOTE
Patient with a history of a left-sided stroke presented with left upper extremity paresthesias.  She presented the emergency room and had neuroimaging done which did not show acute disease to correlate with her symptoms.  Neurology is involved and will check an MRI in the morning.  Continue antiplatelet therapy as well as statin.  Her symptoms have resolved by time I saw her

## 2024-04-09 NOTE — UTILIZATION REVIEW
NOTIFICATION OF INPATIENT ADMISSION   AUTHORIZATION REQUEST   SERVICING FACILITY:   [place] [address]  Tax ID: [id number]  NPI: [id number] ATTENDING PROVIDER:  Attending Name and NPI#: Radames CARPENTER Md [[id number]]  Address: [address] [place] [address]  Phone: [phone]     ADMISSION INFORMATION:  Place of Service: Inpatient [facility]  Place of Service Code: 21  Inpatient Admission Date/Time: 4/8/24  9:29 PM  Discharge Date/Time: No discharge date for patient encounter.  Admitting Diagnosis Code/Description:  Numbness [R20.0]  Arm paresthesia, left [R20.2]  ASVD (arteriosclerotic vascular disease) [I70.90]  Stroke-like symptom [R29.90]     UTILIZATION REVIEW CONTACT:  Margaret Nichole, Utilization   Network Utilization Review Department  Phone: [phone]  Fax [phone]  Email: [email or web address]  Contact for approvals/pending authorizations, clinical reviews, and discharge.     PHYSICIAN ADVISORY SERVICES:  Medical Necessity Denial & [facility] Review  Phone: [phone]  Fax: [phone]  Email: [email or web address]     DISCHARGE SUPPORT TEAM:  For Patients Discharge Needs & Updates  Phone: [phone] opt. [address] Fax: [phone]  Email: [email or web address]

## 2024-04-09 NOTE — DISCHARGE INSTR - AVS FIRST PAGE
Recommend close follow-up with primary care provider within 1 week of discharge.  Recommended close outpatient follow-up with primary neurology.  Recommended to take aspirin 81 mg daily along with Plavix 75 mg daily for 21 days thereafter stop taking aspirin and continue taking Plavix.  Continue taking atorvastatin.

## 2024-04-09 NOTE — ASSESSMENT & PLAN NOTE
53 y/o female with prior stroke with residual R facial droop on aspirin, anxiety, DDD, who presented with complaints of L facial/UE numbness/tingling and worsening of chronic R facial droop. Upon EMS arrival, patient was reportedly profoundly hypertensive. Her BP improved while en route to the hospital. Upon arrival to ED, patient's facial droop had returned to baseline but she continued with L sensory changes. BP on presentation 157/80. Stroke alert was initiated. NIHSS 2. Patient was deemed not a TNK candidate.  Unclear etiology at this time.  Exam nonfocal.  Workup as noted below.    Workup:  - CT head:  1. Chronic infarcts in the left PCA territory.  2. No intracranial hemorrhage or mass effect.  - CTA head and neck:  1. Nondominant right vertebral artery. Multifocal moderate narrowing in the distal V3 segment and atretic, partially occluded V4 segment could represent dissection.  2. No stenosis or occlusion of the major vessels of the [place].  - MRI brain: No acute infarction, edema, or mass effect.   - Labs: 65, A1C 5.8    Plan:  - Stroke pathway  S/p Plavix 300 mg x 1  Per attending neurologist-continue DAPT with aspirin 81 mg and Plavix 75 mg x 21 days, then transition to Plavix monotherapy  Atorvastatin 80 mg per home regimen  Goal normotension; avoid hypotension  Continue telemetry  PT/OT/ST  Frequent neuro checks. Continue to monitor and notify neurology with any changes.   - Lidocaine patch for left shoulder pain  - Medical management and supportive care per primary team. Correction of any metabolic or infectious disturbances.   - No further inpatient neurology recommendations at this time.  Please call with any questions.

## 2024-04-09 NOTE — OCCUPATIONAL THERAPY NOTE
Occupational Therapy Evaluation      Dayanara Benson    2024    Patient Active Problem List   Diagnosis    Back pain    Cerebrovascular accident (CVA) due to embolism of right vertebral artery (HCC)    Retinal hemorrhage of left eye    Pharyngoesophageal dysphagia    Adjustment disorder with depressed mood    Anxiety    Eustachian tube dysfunction, right    Prediabetes    Tinnitus of both ears    Arm paresthesia, left    ASVD (arteriosclerotic vascular disease)       Past Medical History:   Diagnosis Date    Anxiety     COVID-19 2021    DDD (degenerative disc disease), lumbar     Deep vein thrombosis (DVT) associated with COVID-19 2021    Lipoma of back 10/26/2020    Stroke (HCC)        Past Surgical History:   Procedure Laterality Date    BACK SURGERY      BACK SURGERY      for herniated discs     SECTION      LIPOMA RESECTION N/A 2020    Procedure: BACK LIPOMA EXCISION;  Surgeon: Robert Bloch, MD;  [facility]: [facility];  Service: General    TUBAL LIGATION          24 0728   OT Last Visit   OT Visit Date 24   Note Type   Note type Evaluation   Additional Comments Pt agreeable to OT eval. Upon arrival pt supine in bed with HOB elevated   Pain Assessment   Pain Assessment Tool 0-10   Pain Score No Pain   Restrictions/Precautions   Weight Bearing Precautions Per Order No   Other Precautions Multiple lines;Telemetry   Home Living   Type of Home House   Home Layout Two level;1/2 bath on main level;Performs ADLs on one level;Stairs to enter with rails  (6-8 TREY; FOS to 2nd floor)   Bathroom Shower/Tub Tub/shower unit   Bathroom Toilet Standard   Bathroom Equipment   (no DME reported)   Bathroom Accessibility Accessible   Home Equipment   (no AD used at baseline)   Prior Function   Level of [place] Independent with ADLs;Independent with functional mobility   Lives With Son;Daughter   Receives Help From Family   IADLs Independent with driving;Family/Friend/Other  provides meals;Independent with medication management   Falls in the last 6 months 1 to 4  (1 fall recently on stairs)   Vocational Full time employment   Lifestyle   Autonomy Patient reporting being independent with ADLs/IADLs, ambulatory with no AD, and lives with family   Reciprocal Relationships Supportive family   Service to Others Works full-time   Intrinsic Gratification Spending time c dtr   ADL   Eating Assistance 7  Independent   Grooming Assistance 7  Independent   UB Bathing Assistance 7  Independent   LB Bathing Assistance 7  Independent   UB Dressing Assistance 7  Independent   LB Dressing Assistance 7  Independent   Toileting Assistance  7  Independent   Additional Comments ADL levels based on functional performance during OT eval.   Bed Mobility   Supine to Sit 6  Modified independent   Additional items HOB elevated   Sit to Supine 6  Modified independent   Additional items HOB elevated   Transfers   Sit to Stand 7  Independent   Stand to Sit 7  Independent   Functional Mobility   Functional Mobility 7  Independent   Additional Comments Pt ambulated household distance in hallway with no overt SOB/LOB. Pt denied weakness. No AD used.   Balance   Static Sitting Normal   Dynamic Sitting Normal   Static Standing Good   Dynamic Standing Good   Activity Tolerance   Activity Tolerance Patient tolerated treatment well   Medical Staff Made Aware care coordination with PT   RUE Assessment   RUE Assessment WNL   LUE Assessment   LUE Assessment WNL   Hand Function   Gross Motor Coordination Functional   Fine Motor Coordination Functional   Sensation   Light Touch No apparent deficits  (pt reports that symptoms resolved)   Vision-Basic Assessment   Current Vision Wears glasses only for reading   Cognition   Overall Cognitive Status WFL   Arousal/Participation Alert;Responsive;Cooperative   Attention Within functional limits   Orientation Level Oriented X4   Memory Within functional limits   Following Commands  Follows all commands and directions without difficulty   Assessment   Prognosis Good   Assessment Pt is a 54 y.o. female who was admitted to [facility] on 4/8/2024 with REFUGIO mercado.  At this time, patient is also affected by the comorbidities of: ASVD, LUE paresthesia, and anxiety. Additionally, patient is affected by the following personal factors:steps to enter environment. Orders placed for OT evaluation/treatment with up and OOB as tolerated orders. Prior to admission, Patient reporting being independent with ADLs/IADLs, ambulatory with no AD, and lives with family. Upon OT evaluation, patient requires independent  for UB ADLs and independent  for LB ADLs. Patient presents with functional use of BUEs, with intact prehension and fine motor coordination, and symmetrical muscle strength. Patient appears to be functioning at baseline, no further Acute OT needs identified at this time to warrant OT services. D/C OT and from OT standpoint, recommendation at time of d/c would be No post-acute rehabilitation needs .   Goals   Patient Goals to go home   Plan   OT Frequency Eval only   Discharge Recommendation   Rehab Resource Intensity Level, OT No post-acute rehabilitation needs   AM-PAC Daily Activity Inpatient   Lower Body Dressing 4   Bathing 4   Toileting 4   Upper Body Dressing 4   Grooming 4   Eating 4   Daily Activity Raw Score 24   Daily Activity Standardized Score (Calc for Raw Score >=11) 57.54   AM-PAC Applied Cognition Inpatient   Following a Speech/Presentation 4   Understanding Ordinary Conversation 4   Taking Medications 4   Remembering Where Things Are Placed or Put Away 4   Remembering List of 4-5 Errands 4   Taking Care of Complicated Tasks 4   Applied Cognition Raw Score 24   Applied Cognition Standardized Score 62.21   Modified Js Scale   Modified [place] Scale 0   End of Consult   Patient Position at End of Consult Supine;All needs within reach   Nurse Communication Nurse aware of  consult     Alysha GUTIERREZ, OTR/L

## 2024-04-09 NOTE — INCIDENTAL FINDINGS
The following findings require follow up:  Radiographic finding     Finding: Nondominant right vertebral artery. Multifocal moderate narrowing in the distal V3 segment and atretic, partially occluded V4 segment.     Follow up required: YES      Please notify the following clinician to assist with the follow up with your primary care provider and primary urology.

## 2024-04-09 NOTE — H&P
[facility]  Progress Note  Name: Dayanara Benson I  MRN: [id number]  Unit/Bed#: ED 23 I Date of Admission: 4/8/2024   Date of Service: 4/8/2024 I Hospital Day: 0    Assessment/Plan   ASVD (arteriosclerotic vascular disease)  Assessment & Plan  Patient a prior history of a stroke also left side and has only vague persistence of her symptoms since that time.  Symptoms that occurred today were different.  Continue statin and antiplatelet therapy.    Arm paresthesia, left  Assessment & Plan  Patient with a history of a left-sided stroke presented with left upper extremity paresthesias.  She presented the emergency room and had neuroimaging done which did not show acute disease to correlate with her symptoms.  Neurology is involved and will check an MRI in the morning.  Continue antiplatelet therapy as well as statin.  Her symptoms have resolved by time I saw her    Anxiety  Assessment & Plan  Patient with anxiety disorder.  Is on a medicine at home that the daughter cannot quite remember.  Will give her hydroxyzine as needed until we can find that medicine.               VTE Pharmacologic Prophylaxis:   Moderate Risk (Score 3-4) - Pharmacological DVT Prophylaxis Ordered: enoxaparin (Lovenox).  Code Status: No Order full  Discussion with family: Updated  (daughter) at bedside.    Anticipated Length of Stay: Patient will be admitted on an inpatient basis with an anticipated length of stay of greater than 2 midnights secondary to 1.    Total Time Spent on Date of Encounter in care of patient: 30 mins. This time was spent on one or more of the following: performing physical exam; counseling and coordination of care; obtaining or reviewing history; documenting in the medical record; reviewing/ordering tests, medications or procedures; communicating with other healthcare professionals and discussing with patient's family/caregivers.    Chief Complaint: Left arm numbness    History of  Present Illness:  Dayanara Benson is a 54 y.o. female with a PMH of anxiety and prior CVA who presents with arm numbness.  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  It reminded her of numbness she had during her prior stroke which she had occurred on the whole left side of her body.  For that reason she came immediately to the emergency room.  Otherwise denied any headache, vision changes, speech difficulty, nausea, vomiting vomiting, diarrhea no weakness.  In the ED the patient was hemodynamically stable.  Her blood work is unremarkable except for slightly low potassium.  Chest x-ray, CT scan and.  CTA did not show any acute disease.  Neurology was consulted.  The plan is to bring her in an overnight, check an MRI in the morning.  Will place her on a cardiac monitor.  At the time I saw her her symptoms had resolved.    Review of Systems:  Review of Systems   Constitutional:  Negative for activity change, appetite change, chills, diaphoresis and fatigue.   HENT:  Negative for congestion, ear pain, mouth sores, nosebleeds, sneezing, sore throat and trouble swallowing.    Respiratory:  Negative for apnea, cough, choking, chest tightness, shortness of breath, wheezing and stridor.    Cardiovascular:  Negative for chest pain, palpitations and leg swelling.   Gastrointestinal:  Negative for abdominal distention, blood in stool, constipation and rectal pain.   Genitourinary:  Negative for decreased urine volume, dysuria, flank pain and hematuria.   Musculoskeletal:  Negative for arthralgias, back pain, gait problem, joint swelling and myalgias.   Neurological:  Positive for numbness. Negative for dizziness, tremors, seizures, syncope, facial asymmetry, speech difficulty, weakness, light-headedness and headaches.   Hematological:  Positive for adenopathy. Does not bruise/bleed easily.   Psychiatric/Behavioral:  Negative for agitation, behavioral problems, confusion  and hallucinations.        Past Medical and Surgical History:   Past Medical History:   Diagnosis Date    Anxiety     COVID-19 2021    DDD (degenerative disc disease), lumbar     Deep vein thrombosis (DVT) associated with COVID-19 2021    Lipoma of back 10/26/2020    Stroke (HCC)        Past Surgical History:   Procedure Laterality Date    BACK SURGERY      BACK SURGERY      for herniated discs     SECTION      LIPOMA RESECTION N/A 2020    Procedure: BACK LIPOMA EXCISION;  Surgeon: Robert Bloch, MD;  [facility];  Service: General    TUBAL LIGATION         Meds/Allergies:  Prior to Admission medications    Medication Sig Start Date End Date Taking? Authorizing Provider   aspirin (ECOTRIN LOW STRENGTH) 81 mg EC tablet Take 81 mg by mouth daily    Historical Provider, MD   atorvastatin (LIPITOR) 80 mg tablet Take 1 tablet (80 mg total) by mouth daily 3/6/24   Missael Guardado MD   escitalopram (LEXAPRO) 10 mg tablet Take 1 tablet (10 mg total) by mouth daily 3/6/24 3/1/25  Missael Guardado MD   fluticasone (FLONASE) 50 mcg/act nasal spray 1 spray into each nostril 2 (two) times a day 22   Alysha Bearden MD     I have reviewed home medications with patient personally.    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  Patient Pre-hospital Living Situation: Home  Patient Pre-hospital Level of Mobility: walks  Patient Pre-hospital Diet Restrictions: none  Substance Use History:   Social History     Substance and Sexual Activity   Alcohol Use Yes    Comment: 1 per month     Social History     Tobacco Use   Smoking Status Former    Current packs/day: 0.00    Average packs/day: 0.3 packs/day for 10.0 years (2.5 ttl pk-yrs)    Types: Cigarettes    Start date: 1986    Quit date: 1996    Years since quittin.2   Smokeless Tobacco Never     Social History     Substance and Sexual Activity   Drug Use Never        Family History:      Physical Exam:     Vitals:   Blood Pressure: 120/85 (04/08/24 2115)  Pulse: 64 (04/08/24 2130)  Temperature: 97.8 °F (36.6 °C) (04/08/24 1825)  Temp Source: Temporal (04/08/24 1825)  Respirations: 20 (04/08/24 2130)  SpO2: 95 % (04/08/24 2130)    Physical Exam  Vitals reviewed.   Constitutional:       General: She is not in acute distress.     Appearance: Normal appearance. She is not ill-appearing.   HENT:      Head: Normocephalic and atraumatic.      Right Ear: Tympanic membrane normal.      Left Ear: Tympanic membrane normal.      Nose: Nose normal. No congestion.      Mouth/Throat:      Mouth: Mucous membranes are dry.   Eyes:      Pupils: Pupils are equal, round, and reactive to light.   Cardiovascular:      Rate and Rhythm: Normal rate and regular rhythm.   Pulmonary:      Effort: Pulmonary effort is normal. No respiratory distress.      Breath sounds: Normal breath sounds. No stridor.   Abdominal:      General: Abdomen is flat. There is no distension.      Palpations: Abdomen is soft. There is no mass.   Musculoskeletal:         General: No swelling, tenderness or deformity. Normal range of motion.      Cervical back: Normal range of motion. No rigidity or tenderness.   Skin:     General: Skin is warm and dry.      Capillary Refill: Capillary refill takes less than 2 seconds.   Neurological:      General: No focal deficit present.      Mental Status: She is alert and oriented to person, place, and time.      Cranial Nerves: No cranial nerve deficit.      Sensory: No sensory deficit.   Psychiatric:         Mood and Affect: Mood normal.          Additional Data:     Lab Results:  Results from last 7 days   Lab Units 04/08/24  1847   WBC Thousand/uL 9.01   HEMOGLOBIN g/dL 11.3*   HEMATOCRIT % 34.4*   PLATELETS Thousands/uL 229     Results from last 7 days   Lab Units 04/08/24  1847   SODIUM mmol/L 137   POTASSIUM mmol/L 3.4*   CHLORIDE mmol/L 105   CO2 mmol/L 26   BUN mg/dL 17    CREATININE mg/dL 1.05   ANION GAP mmol/L 6   CALCIUM mg/dL 8.7   GLUCOSE RANDOM mg/dL 91     Results from last 7 days   Lab Units 04/08/24  1847   INR  1.01                   Lines/Drains:  Invasive Devices       Peripheral Intravenous Line  Duration             Peripheral IV 04/08/24 Left;Proximal;Ventral (anterior) Forearm <1 day    Peripheral IV 04/08/24 Proximal;Right;Ventral (anterior) Forearm <1 day              Drain  Duration             Closed/Suction Drain Midline Back Bulb 10 Fr. 1225 days                        Imaging: Reviewed radiology reports from this admission including: CT head  XR stroke alert portable chest   Final Result by Cruz Karimi MD (04/08 1919)      No acute cardiopulmonary disease.            Workstation performed: [id number]         CTA stroke alert (head/neck)   Final Result by Cruz Karimi MD (04/08 1914)         1. Nondominant right vertebral artery. Multifocal moderate narrowing in the distal V3 segment and atretic, partially occluded V4 segment could represent dissection.   2. No stenosis or occlusion of the major vessels of the [place].            Findings were directly discussed with Alicia Linares  at  7:00 PM  .                           Workstation performed: [id number]         CT stroke alert brain   Final Result by Cruz Karimi MD (04/08 1914)         1. Chronic infarcts in the left PCA territory.   2. No intracranial hemorrhage or mass effect.      Findings were directly discussed with Alicia Linares  at   6:55 PM  .      Workstation performed: [id number]             EKG and Other Studies Reviewed on Admission:   EKG: NSR. HR  .    ** Please Note: This note has been constructed using a voice recognition system. **

## 2024-04-09 NOTE — ASSESSMENT & PLAN NOTE
Patient a prior history of a stroke also left side and has only vague persistence of her symptoms since that time.  Symptoms that occurred today were different.  Continue statin and antiplatelet therapy.

## 2024-04-10 ENCOUNTER — TRANSITIONAL CARE MANAGEMENT (OUTPATIENT)
Dept: FAMILY MEDICINE CLINIC | Facility: CLINIC | Age: 54
End: 2024-04-10

## 2024-04-10 NOTE — UTILIZATION REVIEW
NOTIFICATION OF ADMISSION DISCHARGE   This is a Notification of Discharge from [facility]. Please be advised that this patient has been discharge from our facility. Below you will find the admission and discharge date and time including the patient’s disposition.   UTILIZATION REVIEW CONTACT:  Griselda Nichole  Utilization   Network Utilization Review Department  Phone: [phone] x carefully listen to the prompts. All voicemails are confidential.  Email: [email or web address]     ADMISSION INFORMATION  PRESENTATION DATE: 4/8/2024  6:20 PM  OBERVATION ADMISSION DATE:   INPATIENT ADMISSION DATE: 4/8/24  9:29 PM   DISCHARGE DATE: 4/9/2024  4:22 PM   DISPOSITION:Home/Self Care    Network Utilization Review Department  ATTENTION: Please call with any questions or concerns to [phone] and carefully listen to the prompts so that you are directed to the right person. All voicemails are confidential.   For Discharge needs, contact Care Management DC Support Team at [phone] opt. 2  Send all requests for admission clinical reviews, approved or denied determinations and any other requests to dedicated fax number below belonging to the campus where the patient is receiving treatment. List of dedicated fax numbers for the Facilities:  FACILITY NAME UR FAX NUMBER   ADMISSION DENIALS (Administrative/Medical Necessity) [phone]   DISCHARGE SUPPORT TEAM ([facility]) [phone]   PARENT CHILD HEALTH (Maternity/NICU/Pediatrics) [phone]   [facility] [phone]   [facility] [phone]   [facility] [phone]   [facility] [phone]   [facility] [phone]   [facility] [phone]   [facility] [phone]   [facility] [phone]    [facility] [phone]   [facility] [phone]   [facility] [phone]   [facility] [phone]

## 2024-04-17 ENCOUNTER — APPOINTMENT (OUTPATIENT)
Dept: LAB | Facility: MEDICAL CENTER | Age: 54
End: 2024-04-17
Payer: COMMERCIAL

## 2024-04-17 ENCOUNTER — OFFICE VISIT (OUTPATIENT)
Dept: FAMILY MEDICINE CLINIC | Facility: CLINIC | Age: 54
End: 2024-04-17
Payer: COMMERCIAL

## 2024-04-17 ENCOUNTER — APPOINTMENT (OUTPATIENT)
Dept: RADIOLOGY | Facility: MEDICAL CENTER | Age: 54
End: 2024-04-17
Payer: COMMERCIAL

## 2024-04-17 VITALS
SYSTOLIC BLOOD PRESSURE: 124 MMHG | WEIGHT: 189 LBS | OXYGEN SATURATION: 96 % | BODY MASS INDEX: 34.78 KG/M2 | RESPIRATION RATE: 16 BRPM | HEART RATE: 74 BPM | HEIGHT: 62 IN | TEMPERATURE: 96.6 F | DIASTOLIC BLOOD PRESSURE: 86 MMHG

## 2024-04-17 DIAGNOSIS — M54.2 ACUTE NECK PAIN: ICD-10-CM

## 2024-04-17 DIAGNOSIS — R20.2 ARM PARESTHESIA, LEFT: ICD-10-CM

## 2024-04-17 DIAGNOSIS — R20.2 ARM PARESTHESIA, LEFT: Primary | ICD-10-CM

## 2024-04-17 DIAGNOSIS — R73.03 PREDIABETES: ICD-10-CM

## 2024-04-17 DIAGNOSIS — I63.111 CEREBROVASCULAR ACCIDENT (CVA) DUE TO EMBOLISM OF RIGHT VERTEBRAL ARTERY (HCC): ICD-10-CM

## 2024-04-17 DIAGNOSIS — R29.90 STROKE-LIKE SYMPTOM: ICD-10-CM

## 2024-04-17 LAB — VIT B12 SERPL-MCNC: 504 PG/ML (ref 180–914)

## 2024-04-17 PROCEDURE — 72050 X-RAY EXAM NECK SPINE 4/5VWS: CPT

## 2024-04-17 PROCEDURE — 99495 TRANSJ CARE MGMT MOD F2F 14D: CPT | Performed by: FAMILY MEDICINE

## 2024-04-17 PROCEDURE — 36415 COLL VENOUS BLD VENIPUNCTURE: CPT

## 2024-04-17 PROCEDURE — 82607 VITAMIN B-12: CPT

## 2024-04-17 RX ORDER — CLOPIDOGREL BISULFATE 75 MG/1
75 TABLET ORAL DAILY
Qty: 90 TABLET | Refills: 3 | Status: SHIPPED | OUTPATIENT
Start: 2024-04-17 | End: 2025-04-12

## 2024-04-17 NOTE — PROGRESS NOTES
Assessment & Plan     1. Arm paresthesia, left  -     Vitamin B12; Future    2. Stroke-like symptom  -     clopidogrel (PLAVIX) 75 mg tablet; Take 1 tablet (75 mg total) by mouth daily    3. Acute neck pain  -     XR spine cervical complete 4 or 5 vw non injury; Future; Expected date: 04/17/2024    4. Cerebrovascular accident (CVA) due to embolism of right vertebral artery (HCC)    5. Prediabetes      TCM visit completed today  Suspected TIA.  Need to follow-up with neurology.  Continue aspirin and Plavix for 21 days then transition to Plavix 75 mg daily.  Medication refilled today  She does have tightness in her trapezius with numbness and tingling radiating into her left hand.  This was present at the time of suspected TIA.  This could be musculoskeletal in nature.  Will obtain cervical spine x-ray.  Will also get a B12 level.  Blood pressure stable.  Reviewed lab work from hospital.     Subjective     Transitional Care Management Review:   Dayanara Benson is a 54 y.o. female here for TCM follow up.     During the TCM phone call patient stated:  TCM Call     Date and time call was made  4/10/2024 10:05 AM    Hospital care reviewed  Records reviewed    Patient was hospitialized at  [facility]    Date of Admission  04/08/24    Date of discharge  04/09/24    Diagnosis  arm paresthesia left    Disposition  Home    Were the patients medications reviewed and updated  Yes    Current Symptoms  None    Weakness severity  Moderate      TCM Call     Post hospital issues  None    Should patient be enrolled in anticoag monitoring?  No    Scheduled for follow up?  Yes    Did you obtain your prescribed medications  Yes    Do you need help managing your prescriptions or medications  No    Why type of assitance do you need  Children helping with medication adherence     Is transportation to your appointment needed  No    Specify why  Not currently driving due to stroke.    I have advised the patient to call PCP with any  new or worsening symptoms  Kiera diaz MA    Living Arrangements  Family members    Support System  Family    The type of support provided  Emotional; Physical    Do you have social support  Yes, as much as I need    Are you recieving any outpatient services  No    What type of services  [facility]     Are you recieving home care services  No    Types of home care services  Home PT; Nurse visit    Are you using any community resources  No    Current waiver services  No    Have you fallen in the last 12 months  No    Interperter language line needed  No    Counseling  Patient    Counseling topics  Prognosis        TCM visit today.  Hospital course listed below.  Overall doing well today.  Present today with her daughter.  Does continue to have left-sided neck and shoulder pain with some numbness and tingling that comes and goes in her left hand.  Denies any weakness.  Denies any changes in her speech.  She has not yet scheduled an appointment with neurology.    Hospital Course:      Dayanara Benson is a 54 y.o. female patient with past medical history of anxiety, morbid obesity, previous CVA, DDD, who originally presented to the hospital on 4/8/2024 due to complaining of left upper extremity numbness and tingling.  She reports that she was working at [employer] or part and suddenly started having left shoulder pain which was followed up with left upper extremity numbness and tingling which resolved without any intervention.  Workup essentially unrevealing including brain MRI negative for acute stroke.  CTA head and neck report noted chronic finding as above.  Currently she is asymptomatic and eager to go home.   Shoulder arthritis may be playing a role as well.  Discussed with neurology and recommendation is to continue dual antiplatelet with aspirin and Plavix for 21 days thereafter transition to Plavix along with continuation of statin with outpatient follow-up with primary neurology.  Patient and daughter both  "are in agreement with all plan.  Currently she is hemodynamically stable for discharge with close outpatient follow-up with primary care provider as well as primary neurologist.         Review of Systems    Objective     /86 (BP Location: Left arm, Patient Position: Sitting, Cuff Size: Large)   Pulse 74   Temp (!) 96.6 °F (35.9 °C)   Resp 16   Ht 5' 2\" (1.575 m)   Wt 85.7 kg (189 lb)   SpO2 96%   BMI 34.57 kg/m²      Physical Exam  Vitals and nursing note reviewed.   Constitutional:       Appearance: Normal appearance. She is well-developed.   HENT:      Head: Normocephalic and atraumatic.   Eyes:      Extraocular Movements: Extraocular movements intact.      Pupils: Pupils are equal, round, and reactive to light.   Cardiovascular:      Rate and Rhythm: Normal rate and regular rhythm.   Pulmonary:      Effort: Pulmonary effort is normal.      Breath sounds: Normal breath sounds.   Abdominal:      General: Bowel sounds are normal.      Palpations: Abdomen is soft.   Musculoskeletal:         General: Tenderness (Left trapezius/neck) present.      Cervical back: Normal range of motion.   Skin:     General: Skin is warm and dry.   Neurological:      Mental Status: She is alert and oriented to person, place, and time. Mental status is at baseline.      Sensory: No sensory deficit.      Motor: No weakness.   Psychiatric:         Mood and Affect: Mood normal.         Speech: Speech normal.         Behavior: Behavior normal.       Medications have been reviewed by provider in current encounter    Missael Guardado MD         "

## 2024-07-16 DIAGNOSIS — I63.111 CEREBROVASCULAR ACCIDENT (CVA) DUE TO EMBOLISM OF RIGHT VERTEBRAL ARTERY (HCC): ICD-10-CM

## 2024-07-17 RX ORDER — ATORVASTATIN CALCIUM 80 MG/1
80 TABLET, FILM COATED ORAL DAILY
Qty: 30 TABLET | Refills: 0 | Status: SHIPPED | OUTPATIENT
Start: 2024-07-17

## 2024-07-17 NOTE — TELEPHONE ENCOUNTER
Refill must be reviewed and completed by the office or provider. The refill is unable to be approved or denied by the medication management team.      Last ordered by another provider. Please review.   Hospital Encounter - 4/8/2024 - 4/9/2024 (22 hours)  [facility]

## 2024-08-26 DIAGNOSIS — I63.111 CEREBROVASCULAR ACCIDENT (CVA) DUE TO EMBOLISM OF RIGHT VERTEBRAL ARTERY (HCC): ICD-10-CM

## 2024-08-26 NOTE — TELEPHONE ENCOUNTER
Reason for call:   [x] Refill   [] Prior Auth  [] Other:     Office:   [x] PCP/Provider -   [] Specialty/Provider -     Medication:   Atorvastatin 80mg- take 1 tablet by mouth daily      Pharmacy: [facility]    Does the patient have enough for 3 days?   [x] Yes   [] No - Send as HP to POD

## 2024-08-27 RX ORDER — ATORVASTATIN CALCIUM 80 MG/1
80 TABLET, FILM COATED ORAL DAILY
Qty: 90 TABLET | Refills: 1 | Status: SHIPPED | OUTPATIENT
Start: 2024-08-27

## 2025-02-19 DIAGNOSIS — I63.111 CEREBROVASCULAR ACCIDENT (CVA) DUE TO EMBOLISM OF RIGHT VERTEBRAL ARTERY (HCC): ICD-10-CM

## 2025-02-19 NOTE — TELEPHONE ENCOUNTER
Reason for call:   [x] Refill   [] Prior Auth  [] Other:     Office:   [x] PCP/Provider - Missael Guardado,   [] Specialty/Provider -     Medication: atorvastatin (LIPITOR) 80 mg     Dose/Frequency: 1 daily    Quantity: 90    Pharmacy: [facility]    Does the patient have enough for 3 days?   [x] Yes   [] No - Send as HP to POD

## 2025-02-20 RX ORDER — ATORVASTATIN CALCIUM 80 MG/1
80 TABLET, FILM COATED ORAL DAILY
Qty: 90 TABLET | Refills: 0 | Status: SHIPPED | OUTPATIENT
Start: 2025-02-20

## 2025-02-22 DIAGNOSIS — R29.90 STROKE-LIKE SYMPTOM: ICD-10-CM

## 2025-02-24 DIAGNOSIS — F41.9 ANXIETY: ICD-10-CM

## 2025-02-24 DIAGNOSIS — F43.21 ADJUSTMENT DISORDER WITH DEPRESSED MOOD: ICD-10-CM

## 2025-02-24 RX ORDER — CLOPIDOGREL BISULFATE 75 MG/1
75 TABLET ORAL DAILY
Qty: 30 TABLET | Refills: 0 | Status: SHIPPED | OUTPATIENT
Start: 2025-02-24

## 2025-02-25 RX ORDER — ESCITALOPRAM OXALATE 10 MG/1
10 TABLET ORAL DAILY
Qty: 30 TABLET | Refills: 0 | Status: SHIPPED | OUTPATIENT
Start: 2025-02-25

## 2025-03-12 ENCOUNTER — OFFICE VISIT (OUTPATIENT)
Dept: FAMILY MEDICINE CLINIC | Facility: CLINIC | Age: 55
End: 2025-03-12
Payer: COMMERCIAL

## 2025-03-12 VITALS
HEIGHT: 62 IN | SYSTOLIC BLOOD PRESSURE: 104 MMHG | OXYGEN SATURATION: 96 % | TEMPERATURE: 97.2 F | WEIGHT: 181.5 LBS | BODY MASS INDEX: 33.4 KG/M2 | RESPIRATION RATE: 16 BRPM | DIASTOLIC BLOOD PRESSURE: 70 MMHG | HEART RATE: 64 BPM

## 2025-03-12 DIAGNOSIS — Z00.00 ANNUAL PHYSICAL EXAM: Primary | ICD-10-CM

## 2025-03-12 DIAGNOSIS — Z12.31 ENCOUNTER FOR SCREENING MAMMOGRAM FOR BREAST CANCER: ICD-10-CM

## 2025-03-12 DIAGNOSIS — F41.9 ANXIETY: ICD-10-CM

## 2025-03-12 DIAGNOSIS — R73.03 PREDIABETES: ICD-10-CM

## 2025-03-12 DIAGNOSIS — F43.21 ADJUSTMENT DISORDER WITH DEPRESSED MOOD: ICD-10-CM

## 2025-03-12 DIAGNOSIS — Z86.73 HISTORY OF CVA (CEREBROVASCULAR ACCIDENT): ICD-10-CM

## 2025-03-12 DIAGNOSIS — I63.111 CEREBROVASCULAR ACCIDENT (CVA) DUE TO EMBOLISM OF RIGHT VERTEBRAL ARTERY (HCC): ICD-10-CM

## 2025-03-12 PROCEDURE — 99214 OFFICE O/P EST MOD 30 MIN: CPT | Performed by: FAMILY MEDICINE

## 2025-03-12 PROCEDURE — 99396 PREV VISIT EST AGE 40-64: CPT | Performed by: FAMILY MEDICINE

## 2025-03-12 RX ORDER — ESCITALOPRAM OXALATE 10 MG/1
10 TABLET ORAL DAILY
Qty: 90 TABLET | Refills: 3 | Status: SHIPPED | OUTPATIENT
Start: 2025-03-12

## 2025-03-12 RX ORDER — CLOPIDOGREL BISULFATE 75 MG/1
75 TABLET ORAL DAILY
Qty: 90 TABLET | Refills: 3 | Status: SHIPPED | OUTPATIENT
Start: 2025-03-12

## 2025-03-12 RX ORDER — ATORVASTATIN CALCIUM 80 MG/1
80 TABLET, FILM COATED ORAL DAILY
Qty: 90 TABLET | Refills: 3 | Status: SHIPPED | OUTPATIENT
Start: 2025-03-12

## 2025-03-12 NOTE — ASSESSMENT & PLAN NOTE
Patient feels her anxiety is currently stable.  Continue Lexapro 10 mg daily.  Repeat labs  Orders:    escitalopram (LEXAPRO) 10 mg tablet; Take 1 tablet (10 mg total) by mouth daily    TSH, 3rd generation with Free T4 reflex; Future

## 2025-03-12 NOTE — ASSESSMENT & PLAN NOTE
Orders:    atorvastatin (LIPITOR) 80 mg tablet; Take 1 tablet (80 mg total) by mouth daily    Basic metabolic panel; Future    CBC; Future

## 2025-03-12 NOTE — PROGRESS NOTES
Adult Annual Physical  Name: Dayanara Benson      : 1970      MRN: [id number]  Encounter Provider: Missael Guardado MD  Encounter Date: 3/12/2025   Encounter department: [facility]    Assessment & Plan  Prediabetes  A1c previously prediabetic range.  Repeat A1c  Orders:    Hemoglobin A1C; Future    Adjustment disorder with depressed mood    Orders:    escitalopram (LEXAPRO) 10 mg tablet; Take 1 tablet (10 mg total) by mouth daily    Anxiety  Patient feels her anxiety is currently stable.  Continue Lexapro 10 mg daily.  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   Remains on aspirin and Plavix.  Follows with neurology at [facility].  Dr. Noonan     Orders:    clopidogrel (PLAVIX) 75 mg tablet; Take 1 tablet (75 mg total) by mouth daily    atorvastatin (LIPITOR) 80 mg tablet; Take 1 tablet (80 mg total) by mouth daily    Lipid panel; Future    Annual physical exam         Cerebrovascular accident (CVA) due to embolism of right vertebral artery (HCC)    Orders:    atorvastatin (LIPITOR) 80 mg tablet; Take 1 tablet (80 mg total) by mouth daily    Basic metabolic panel; Future    CBC; Future    Encounter for screening mammogram for breast cancer    Orders:    Mammo screening bilateral w 3d and cad; Future    Preventive Screenings:  - Diabetes Screening: screening up-to-date  - Hepatitis C screening: screening up-to-date   - Breast cancer screening: risks/benefits discussed and orders placed   - Colon cancer screening: screening up-to-date   - Lung cancer screening: screening not indicated     Immunizations:  - Immunizations due: Influenza and  Zoster (Shingrix)  - Risks/benefits immunizations discussed    - The patient declines recommended vaccines currently despite my recommendations      Counseling/Anticipatory Guidance:  - Alcohol: discussed moderation in alcohol intake and recommendations for healthy alcohol use.   - Drug use: discussed harms of illicit drug use and how it can negatively impact mental/physical health.   - Dental health: discussed importance of regular tooth brushing, flossing, and dental visits.   - Sexual health: discussed sexually transmitted diseases, partner selection, use of condoms, avoidance of unintended pregnancy, and contraceptive alternatives.   - Diet: discussed recommendations for a healthy/well-balanced diet.   - Exercise: the importance of regular exercise/physical activity was discussed. Recommend exercise 3-5 times per week for at least 30 minutes.   - Injury prevention: discussed safety/seat belts, safety helmets, smoke detectors, carbon monoxide detectors, and smoking near bedding or upholstery.        Annual physical and Problem visit completed today   Orders and plan as above       History of Present Illness     Adult Annual Physical:  Patient presents for annual physical. Neurology at [facility].  Remains on Plavix and Lipitor..     Diet and Physical Activity:  - Diet/Nutrition: well balanced diet.  - Exercise: walking.    Depression Screening:    - PHQ-9 Score: 0    General Health:  - Sleep: sleeps well.  - Hearing: normal hearing bilateral ears.  - Vision: no vision problems.  - Dental: regular dental visits.    /GYN Health:  - Follows with GYN: yes.     Review of Systems   Constitutional:  Negative for activity change, fatigue and fever.   Eyes:  Negative for visual disturbance.   Respiratory:  Negative for shortness of breath.    Cardiovascular:  Negative for chest pain.   Gastrointestinal:  Negative for abdominal pain, constipation, diarrhea and nausea.   Endocrine: Negative for cold intolerance and  heat intolerance.   Musculoskeletal:  Negative for back pain.   Skin:  Negative for rash.   Neurological:  Negative for headaches.   Psychiatric/Behavioral:  Negative for confusion.      Medical History Reviewed by provider this encounter:  Tobacco  Allergies  Meds  Problems  Med Hx  Surg Hx  Fam Hx     .  Current Outpatient Medications on File Prior to Visit   Medication Sig Dispense Refill    [DISCONTINUED] atorvastatin (LIPITOR) 80 mg tablet Take 1 tablet (80 mg total) by mouth daily 90 tablet 0    [DISCONTINUED] clopidogrel (PLAVIX) 75 mg tablet TAKE 1 TABLET BY MOUTH EVERY DAY 30 tablet 0    [DISCONTINUED] escitalopram (LEXAPRO) 10 mg tablet TAKE 1 TABLET BY MOUTH EVERY DAY 30 tablet 0    aspirin (ECOTRIN LOW STRENGTH) 81 mg EC tablet Take 1 tablet (81 mg total) by mouth daily (Patient not taking: Reported on 3/12/2025) 21 tablet 0    fluticasone (FLONASE) 50 mcg/act nasal spray 1 spray into each nostril 2 (two) times a day (Patient not taking: Reported on 3/12/2025) 11.1 mL 3    lidocaine (LIDODERM) 5 % Apply 1 patch topically over 12 hours daily Remove & Discard patch within 12 hours or as directed by MD (Patient not taking: Reported on 2024) 14 patch 0     No current facility-administered medications on file prior to visit.      Social History     Tobacco Use    Smoking status: Former     Current packs/day: 0.00     Average packs/day: 0.3 packs/day for 10.0 years (2.5 ttl pk-yrs)     Types: Cigarettes     Start date: 1986     Quit date: 1996     Years since quittin.2    Smokeless tobacco: Never   Vaping Use    Vaping status: Never Used   Substance and Sexual Activity    Alcohol use: Yes     Comment: 1 per month    Drug use: Never    Sexual activity: Not Currently     Partners: Male     Birth control/protection: None       Objective   /70 (BP Location: Left arm, Patient Position: Sitting, Cuff Size: Large)   Pulse 64   Temp (!) 97.2 °F (36.2 °C) (Temporal)   Resp 16   Ht 5'  "2\" (1.575 m)   Wt 82.3 kg (181 lb 8 oz)   SpO2 96%   BMI 33.20 kg/m²     Physical Exam  Vitals and nursing note reviewed.   Constitutional:       Appearance: Normal appearance. She is well-developed.   HENT:      Head: Normocephalic and atraumatic.   Cardiovascular:      Rate and Rhythm: Normal rate and regular rhythm.   Pulmonary:      Effort: Pulmonary effort is normal.      Breath sounds: Normal breath sounds.   Abdominal:      General: Bowel sounds are normal.      Palpations: Abdomen is soft.   Musculoskeletal:      Cervical back: Normal range of motion.   Skin:     General: Skin is warm.   Neurological:      General: No focal deficit present.      Mental Status: She is alert.   Psychiatric:         Mood and Affect: Mood normal.         Speech: Speech normal.         "

## 2025-03-19 ENCOUNTER — APPOINTMENT (OUTPATIENT)
Dept: LAB | Facility: MEDICAL CENTER | Age: 55
End: 2025-03-19
Payer: COMMERCIAL

## 2025-03-19 DIAGNOSIS — Z86.73 HISTORY OF CVA (CEREBROVASCULAR ACCIDENT): ICD-10-CM

## 2025-03-19 DIAGNOSIS — R73.03 PREDIABETES: ICD-10-CM

## 2025-03-19 DIAGNOSIS — I63.111 CEREBROVASCULAR ACCIDENT (CVA) DUE TO EMBOLISM OF RIGHT VERTEBRAL ARTERY (HCC): ICD-10-CM

## 2025-03-19 DIAGNOSIS — F41.9 ANXIETY: ICD-10-CM

## 2025-03-19 LAB
ANION GAP SERPL CALCULATED.3IONS-SCNC: 6 MMOL/L (ref 4–13)
BUN SERPL-MCNC: 16 MG/DL (ref 5–25)
CALCIUM SERPL-MCNC: 9.2 MG/DL (ref 8.4–10.2)
CHLORIDE SERPL-SCNC: 107 MMOL/L (ref 96–108)
CHOLEST SERPL-MCNC: 152 MG/DL (ref ?–200)
CO2 SERPL-SCNC: 29 MMOL/L (ref 21–32)
CREAT SERPL-MCNC: 0.83 MG/DL (ref 0.6–1.3)
ERYTHROCYTE [DISTWIDTH] IN BLOOD BY AUTOMATED COUNT: 14.2 % (ref 11.6–15.1)
EST. AVERAGE GLUCOSE BLD GHB EST-MCNC: 123 MG/DL
GFR SERPL CREATININE-BSD FRML MDRD: 80 ML/MIN/1.73SQ M
GLUCOSE P FAST SERPL-MCNC: 85 MG/DL (ref 65–99)
HBA1C MFR BLD: 5.9 %
HCT VFR BLD AUTO: 42.5 % (ref 34.8–46.1)
HDLC SERPL-MCNC: 72 MG/DL
HGB BLD-MCNC: 13.3 G/DL (ref 11.5–15.4)
LDLC SERPL CALC-MCNC: 70 MG/DL (ref 0–100)
MCH RBC QN AUTO: 30 PG (ref 26.8–34.3)
MCHC RBC AUTO-ENTMCNC: 31.3 G/DL (ref 31.4–37.4)
MCV RBC AUTO: 96 FL (ref 82–98)
NONHDLC SERPL-MCNC: 80 MG/DL
PLATELET # BLD AUTO: 253 THOUSANDS/UL (ref 149–390)
PMV BLD AUTO: 10.6 FL (ref 8.9–12.7)
POTASSIUM SERPL-SCNC: 4.2 MMOL/L (ref 3.5–5.3)
RBC # BLD AUTO: 4.44 MILLION/UL (ref 3.81–5.12)
SODIUM SERPL-SCNC: 142 MMOL/L (ref 135–147)
T4 FREE SERPL-MCNC: 0.32 NG/DL (ref 0.61–1.12)
TRIGL SERPL-MCNC: 49 MG/DL (ref ?–150)
TSH SERPL DL<=0.05 MIU/L-ACNC: 155.59 UIU/ML (ref 0.45–4.5)
WBC # BLD AUTO: 5.45 THOUSAND/UL (ref 4.31–10.16)

## 2025-03-19 PROCEDURE — 80061 LIPID PANEL: CPT

## 2025-03-19 PROCEDURE — 84443 ASSAY THYROID STIM HORMONE: CPT

## 2025-03-19 PROCEDURE — 83036 HEMOGLOBIN GLYCOSYLATED A1C: CPT

## 2025-03-19 PROCEDURE — 36415 COLL VENOUS BLD VENIPUNCTURE: CPT

## 2025-03-19 PROCEDURE — 84439 ASSAY OF FREE THYROXINE: CPT

## 2025-03-19 PROCEDURE — 85027 COMPLETE CBC AUTOMATED: CPT

## 2025-03-19 PROCEDURE — 80048 BASIC METABOLIC PNL TOTAL CA: CPT

## 2025-03-25 ENCOUNTER — RESULTS FOLLOW-UP (OUTPATIENT)
Dept: FAMILY MEDICINE CLINIC | Facility: CLINIC | Age: 55
End: 2025-03-25

## 2025-03-25 DIAGNOSIS — R79.89 ELEVATED TSH: Primary | ICD-10-CM

## 2025-03-27 ENCOUNTER — NURSE TRIAGE (OUTPATIENT)
Dept: OTHER | Facility: OTHER | Age: 55
End: 2025-03-27

## 2025-03-27 NOTE — TELEPHONE ENCOUNTER
"Patient calling in stating she missed a call from office regarding lab results.    Advised patient of Dr. Guardado's note that her TSH is elevated and her T4 is low which are consistent with hypothyroidism. Advised patient If she is taking Biotin, she needs to hold medication for the next 72 hours. Pt denies that she takes biotin. Advised her that provider would like her to repeat her lab work in the next 4 weeks.     Patient verbalized understanding of above.   Reason for Disposition  • [1] Follow-up call to recent contact AND [2] information only call, no triage required    Answer Assessment - Initial Assessment Questions  1. REASON FOR CALL: \"What is the main reason for your call?\" or \"How can I best help you?\"   Patient calling in stating she missed a call from office regarding lab results.     Advised patient of Dr. Guardado's note that her TSH is elevated and her T4 is low which are consistent with hypothyroidism. Advised patient If she is taking Biotin, she needs to hold medication for the next 72 hours. Pt denies that she takes biotin. Advised her that provider would like her to repeat her lab work in the next 4 weeks.      Patient verbalized understanding of above.    Protocols used: Information Only Call - No Triage-Adult-    "

## 2025-03-27 NOTE — TELEPHONE ENCOUNTER
Regarding: Question & concern lab results  ----- Message from Rishabh CASTRO sent at 3/27/2025  5:51 PM EDT -----  '' I have question and concern regarding lab results.''

## 2025-04-02 ENCOUNTER — LAB (OUTPATIENT)
Dept: LAB | Facility: MEDICAL CENTER | Age: 55
End: 2025-04-02
Payer: COMMERCIAL

## 2025-04-02 DIAGNOSIS — R79.89 ELEVATED TSH: ICD-10-CM

## 2025-04-02 LAB
T4 FREE SERPL-MCNC: 0.28 NG/DL (ref 0.61–1.12)
TSH SERPL DL<=0.05 MIU/L-ACNC: 121.1 UIU/ML (ref 0.45–4.5)

## 2025-04-02 PROCEDURE — 36415 COLL VENOUS BLD VENIPUNCTURE: CPT

## 2025-04-02 PROCEDURE — 86376 MICROSOMAL ANTIBODY EACH: CPT

## 2025-04-02 PROCEDURE — 84443 ASSAY THYROID STIM HORMONE: CPT

## 2025-04-02 PROCEDURE — 86800 THYROGLOBULIN ANTIBODY: CPT

## 2025-04-02 PROCEDURE — 84439 ASSAY OF FREE THYROXINE: CPT

## 2025-04-03 LAB
THYROGLOB AB SERPL-ACNC: 79.3 IU/ML (ref 0–0.9)
THYROPEROXIDASE AB SERPL-ACNC: 91 IU/ML (ref 0–34)

## 2025-04-18 ENCOUNTER — RESULTS FOLLOW-UP (OUTPATIENT)
Dept: FAMILY MEDICINE CLINIC | Facility: CLINIC | Age: 55
End: 2025-04-18

## 2025-04-21 NOTE — RESULT ENCOUNTER NOTE
Called patient on 4/21/25 and LMTCB to schedule appointment with provider to discuss starting medication.

## 2025-04-23 ENCOUNTER — TELEPHONE (OUTPATIENT)
Age: 55
End: 2025-04-23

## 2025-04-23 ENCOUNTER — OFFICE VISIT (OUTPATIENT)
Dept: FAMILY MEDICINE CLINIC | Facility: CLINIC | Age: 55
End: 2025-04-23
Payer: COMMERCIAL

## 2025-04-23 VITALS
BODY MASS INDEX: 33.2 KG/M2 | HEART RATE: 57 BPM | OXYGEN SATURATION: 95 % | RESPIRATION RATE: 16 BRPM | SYSTOLIC BLOOD PRESSURE: 126 MMHG | TEMPERATURE: 97.9 F | WEIGHT: 181.5 LBS | DIASTOLIC BLOOD PRESSURE: 88 MMHG

## 2025-04-23 DIAGNOSIS — E06.3 HYPOTHYROIDISM DUE TO HASHIMOTO THYROIDITIS: Primary | ICD-10-CM

## 2025-04-23 PROCEDURE — 99214 OFFICE O/P EST MOD 30 MIN: CPT | Performed by: FAMILY MEDICINE

## 2025-04-23 RX ORDER — LEVOTHYROXINE SODIUM 50 UG/1
50 TABLET ORAL DAILY
Qty: 90 TABLET | Refills: 1 | Status: SHIPPED | OUTPATIENT
Start: 2025-04-23

## 2025-04-23 NOTE — PROGRESS NOTES
Name: Dayanara Benson      : 1970      MRN: [id number]  Encounter Provider: Missael Guardado MD  Encounter Date: 2025   Encounter department: [facility]  :  Assessment & Plan  Hypothyroidism due to Hashimoto thyroiditis  Lab Results   Component Value Date    [id number] 121.100 (H) 2025    FREET4 0.28 (L) 2025     Positive antibody suspect Hashimoto's.  Start levothyroxine 50 mcg daily.  Repeat labs in 4 weeks.  Discussed taking levothyroxine on empty stomach and and nothing else by mouth for 90 minutes.  Will likely need to adjust medication in 6 weeks when she repeats her labs.  If unable to control her thyroid can consider referral to endocrinology.  Looking back at her labs.  Her TSH was elevated back in  9.43.  Likely contributing to her increased weight gain.  Occasional fatigue.  Orders:    levothyroxine (Euthyrox) 50 mcg tablet; Take 1 tablet (50 mcg total) by mouth daily    TSH, 3rd generation; Future    T4, free; Future           History of Present Illness   Patient presents with:  Labs consistent with hypothyroidism    Patient was today to discuss recent lab work.  Positive thyroid antibodies elevated TSH and low T4 consistent with Hashimoto's thyroiditis.  She does endorse weight gain.  Never been diagnosed with thyroid conditions in the past.      Review of Systems   Constitutional:  Positive for fatigue and unexpected weight change. Negative for activity change and fever.   Eyes:  Negative for visual disturbance.   Respiratory:  Negative for shortness of breath.    Cardiovascular:  Negative for chest pain.   Gastrointestinal:  Negative for abdominal pain, constipation, diarrhea and nausea.   Endocrine: Negative for cold intolerance and heat intolerance.   Musculoskeletal:  Negative for back pain.   Skin:  Negative for rash.   Neurological:  Negative for headaches.   Psychiatric/Behavioral:  Negative for confusion.        Objective   /88 (BP  Location: Left arm, Patient Position: Sitting, Cuff Size: Large)   Pulse 57   Temp 97.9 °F (36.6 °C) (Temporal)   Resp 16   Wt 82.3 kg (181 lb 8 oz)   SpO2 95%   BMI 33.20 kg/m²      Physical Exam  Vitals and nursing note reviewed.   Constitutional:       Appearance: Normal appearance. She is well-developed.   HENT:      Head: Normocephalic and atraumatic.   Cardiovascular:      Rate and Rhythm: Normal rate and regular rhythm.   Pulmonary:      Effort: Pulmonary effort is normal.      Breath sounds: Normal breath sounds.   Abdominal:      General: Bowel sounds are normal.      Palpations: Abdomen is soft.   Musculoskeletal:      Cervical back: Normal range of motion.   Skin:     General: Skin is warm.   Neurological:      General: No focal deficit present.      Mental Status: She is alert.   Psychiatric:         Mood and Affect: Mood normal.         Speech: Speech normal.

## 2025-04-23 NOTE — TELEPHONE ENCOUNTER
Patient was prescribed levothyroxine today.  Patient would like to make sure it is safe to take her current medications all together Please advise

## 2025-04-23 NOTE — TELEPHONE ENCOUNTER
Called patient gave her message letting patient know its okay for her to with the rest of her medication   Also reminded patient that levothyroxine is taken on an empty stomach and nothing else by mouth for 90 minutes. Just ask discussed in her office visit with   Patient understood

## 2025-04-30 ENCOUNTER — HOSPITAL ENCOUNTER (OUTPATIENT)
Dept: RADIOLOGY | Facility: MEDICAL CENTER | Age: 55
Discharge: HOME | End: 2025-04-30
Payer: COMMERCIAL

## 2025-04-30 VITALS — WEIGHT: 181 LBS | BODY MASS INDEX: 33.31 KG/M2 | HEIGHT: 62 IN

## 2025-04-30 DIAGNOSIS — Z12.31 ENCOUNTER FOR SCREENING MAMMOGRAM FOR BREAST CANCER: ICD-10-CM

## 2025-04-30 PROCEDURE — 77067 SCR MAMMO BI INCL CAD: CPT

## 2025-04-30 PROCEDURE — 77063 BREAST TOMOSYNTHESIS BI: CPT

## 2025-06-11 ENCOUNTER — APPOINTMENT (OUTPATIENT)
Dept: LAB | Facility: MEDICAL CENTER | Age: 55
End: 2025-06-11
Attending: FAMILY MEDICINE
Payer: COMMERCIAL

## 2025-06-11 DIAGNOSIS — E06.3 HYPOTHYROIDISM DUE TO HASHIMOTO THYROIDITIS: ICD-10-CM

## 2025-06-11 LAB
T4 FREE SERPL-MCNC: 0.55 NG/DL (ref 0.61–1.12)
TSH SERPL DL<=0.05 MIU/L-ACNC: 37.17 UIU/ML (ref 0.45–4.5)

## 2025-06-11 PROCEDURE — 84439 ASSAY OF FREE THYROXINE: CPT

## 2025-06-11 PROCEDURE — 36415 COLL VENOUS BLD VENIPUNCTURE: CPT

## 2025-06-11 PROCEDURE — 84443 ASSAY THYROID STIM HORMONE: CPT

## 2025-06-12 ENCOUNTER — RESULTS FOLLOW-UP (OUTPATIENT)
Dept: FAMILY MEDICINE CLINIC | Facility: CLINIC | Age: 55
End: 2025-06-12

## 2025-07-28 ENCOUNTER — APPOINTMENT (OUTPATIENT)
Dept: LAB | Facility: MEDICAL CENTER | Age: 55
End: 2025-07-28
Attending: FAMILY MEDICINE
Payer: COMMERCIAL

## 2025-07-28 DIAGNOSIS — E06.3 HYPOTHYROIDISM DUE TO HASHIMOTO THYROIDITIS: ICD-10-CM

## 2025-07-28 LAB
T4 FREE SERPL-MCNC: 0.86 NG/DL (ref 0.61–1.12)
TSH SERPL DL<=0.05 MIU/L-ACNC: 6.79 UIU/ML (ref 0.45–4.5)

## 2025-07-28 PROCEDURE — 84439 ASSAY OF FREE THYROXINE: CPT

## 2025-07-28 PROCEDURE — 84443 ASSAY THYROID STIM HORMONE: CPT

## 2025-07-28 PROCEDURE — 36415 COLL VENOUS BLD VENIPUNCTURE: CPT

## 2025-08-01 ENCOUNTER — RESULTS FOLLOW-UP (OUTPATIENT)
Dept: FAMILY MEDICINE CLINIC | Facility: CLINIC | Age: 55
End: 2025-08-01

## 2025-08-01 DIAGNOSIS — E06.3 HYPOTHYROIDISM DUE TO HASHIMOTO THYROIDITIS: Primary | ICD-10-CM

## 2025-08-01 RX ORDER — LEVOTHYROXINE SODIUM 112 UG/1
112 TABLET ORAL
Qty: 100 TABLET | Refills: 3 | Status: SHIPPED | OUTPATIENT
Start: 2025-08-01

## 2025-08-01 RX ORDER — LEVOTHYROXINE SODIUM 112 UG/1
112 TABLET ORAL
Qty: 100 TABLET | Refills: 3 | Status: CANCELLED | OUTPATIENT
Start: 2025-08-01

## (undated) DEVICE — SUT PROLENE 4-0 PS-2 18 IN 8682G

## (undated) DEVICE — ELECTRODE NEEDLE E-Z CLEAN 2.75IN 7CM -0013

## (undated) DEVICE — POOLE SUCTION HANDLE: Brand: CARDINAL HEALTH

## (undated) DEVICE — ADHESIVE SKIN HIGH VISCOSITY EXOFIN 1ML

## (undated) DEVICE — SUT MONOCRYL 4-0 PS-2 18 IN Y496G

## (undated) DEVICE — INTENDED FOR TISSUE SEPARATION, AND OTHER PROCEDURES THAT REQUIRE A SHARP SURGICAL BLADE TO PUNCTURE OR CUT.: Brand: BARD-PARKER SAFETY BLADES SIZE 15, STERILE

## (undated) DEVICE — TUBING SUCTION 5MM X 12 FT

## (undated) DEVICE — BETHLEHEM UNIVERSAL MINOR GEN: Brand: CARDINAL HEALTH

## (undated) DEVICE — GLOVE INDICATOR PI UNDERGLOVE SZ 7.5 BLUE

## (undated) DEVICE — PLUMEPEN PRO 10FT

## (undated) DEVICE — SUT VICRYL 2-0 REEL 54 IN J286G

## (undated) DEVICE — SUT VICRYL 2-0 SH 27 IN UNDYED J417H

## (undated) DEVICE — GLOVE SRG BIOGEL 7.5

## (undated) DEVICE — NEEDLE 25G X 1 1/2

## (undated) DEVICE — ANTIBACTERIAL UNDYED BRAIDED (POLYGLACTIN 910), SYNTHETIC ABSORBABLE SUTURE: Brand: COATED VICRYL

## (undated) DEVICE — LIGHT HANDLE COVER SLEEVE DISP BLUE STELLAR